# Patient Record
Sex: FEMALE | Race: OTHER | Employment: OTHER | URBAN - METROPOLITAN AREA
[De-identification: names, ages, dates, MRNs, and addresses within clinical notes are randomized per-mention and may not be internally consistent; named-entity substitution may affect disease eponyms.]

---

## 2017-03-07 ENCOUNTER — TELEPHONE (OUTPATIENT)
Dept: FAMILY MEDICINE CLINIC | Facility: CLINIC | Age: 72
End: 2017-03-07

## 2017-03-07 NOTE — TELEPHONE ENCOUNTER
Dr. Leon Felix  Pt has labs pending from 11/2016 would you like to add any other labs. Pt has an OV 3/14/17 and would like to know if she should be fasting for OV.  I will inform pt she can have pending labs drawn prior to OV  Please advise

## 2017-03-07 NOTE — TELEPHONE ENCOUNTER
Pt spouse would like to have a call back to know if pt need to be fasting for her annual [facility] appt   Please advise

## 2017-03-09 ENCOUNTER — OFFICE VISIT (OUTPATIENT)
Dept: FAMILY MEDICINE CLINIC | Facility: CLINIC | Age: 72
End: 2017-03-09

## 2017-03-09 ENCOUNTER — LAB ENCOUNTER (OUTPATIENT)
Dept: LAB | Age: 72
End: 2017-03-09
Attending: FAMILY MEDICINE
Payer: MEDICARE

## 2017-03-09 VITALS
SYSTOLIC BLOOD PRESSURE: 97 MMHG | OXYGEN SATURATION: 96 % | BODY MASS INDEX: 21.43 KG/M2 | HEIGHT: 61.5 IN | HEART RATE: 73 BPM | WEIGHT: 115 LBS | DIASTOLIC BLOOD PRESSURE: 64 MMHG | TEMPERATURE: 98 F

## 2017-03-09 DIAGNOSIS — E78.00 HYPERCHOLESTEREMIA: ICD-10-CM

## 2017-03-09 DIAGNOSIS — E53.8 VITAMIN B12 DEFICIENCY: ICD-10-CM

## 2017-03-09 DIAGNOSIS — E03.9 HYPOTHYROIDISM, UNSPECIFIED TYPE: Primary | ICD-10-CM

## 2017-03-09 DIAGNOSIS — F32.A ANXIETY AND DEPRESSION: ICD-10-CM

## 2017-03-09 DIAGNOSIS — R53.1 WEAKNESS: ICD-10-CM

## 2017-03-09 DIAGNOSIS — E55.9 VITAMIN D DEFICIENCY: ICD-10-CM

## 2017-03-09 DIAGNOSIS — F41.9 ANXIETY AND DEPRESSION: ICD-10-CM

## 2017-03-09 DIAGNOSIS — E03.9 HYPOTHYROIDISM, UNSPECIFIED TYPE: ICD-10-CM

## 2017-03-09 LAB
ALT SERPL-CCNC: 13 U/L (ref 14–54)
ANION GAP SERPL CALC-SCNC: 8 MMOL/L (ref 0–18)
AST SERPL-CCNC: 23 U/L (ref 15–41)
BASOPHILS # BLD: 0.1 K/UL (ref 0–0.2)
BASOPHILS NFR BLD: 1 %
BUN SERPL-MCNC: 11 MG/DL (ref 8–20)
BUN/CREAT SERPL: 12.2 (ref 10–20)
CALCIUM SERPL-MCNC: 9.3 MG/DL (ref 8.5–10.5)
CHLORIDE SERPL-SCNC: 107 MMOL/L (ref 95–110)
CO2 SERPL-SCNC: 24 MMOL/L (ref 22–32)
CREAT SERPL-MCNC: 0.9 MG/DL (ref 0.5–1.5)
EOSINOPHIL # BLD: 0.2 K/UL (ref 0–0.7)
EOSINOPHIL NFR BLD: 3 %
ERYTHROCYTE [DISTWIDTH] IN BLOOD BY AUTOMATED COUNT: 13.1 % (ref 11–15)
GLUCOSE SERPL-MCNC: 127 MG/DL (ref 70–99)
HCT VFR BLD AUTO: 39.7 % (ref 35–48)
HGB BLD-MCNC: 13.3 G/DL (ref 12–16)
LYMPHOCYTES # BLD: 1.9 K/UL (ref 1–4)
LYMPHOCYTES NFR BLD: 28 %
MCH RBC QN AUTO: 32 PG (ref 27–32)
MCHC RBC AUTO-ENTMCNC: 33.5 G/DL (ref 32–37)
MCV RBC AUTO: 95.5 FL (ref 80–100)
MONOCYTES # BLD: 0.5 K/UL (ref 0–1)
MONOCYTES NFR BLD: 7 %
NEUTROPHILS # BLD AUTO: 4.3 K/UL (ref 1.8–7.7)
NEUTROPHILS NFR BLD: 62 %
OSMOLALITY UR CALC.SUM OF ELEC: 289 MOSM/KG (ref 275–295)
PLATELET # BLD AUTO: 272 K/UL (ref 140–400)
PMV BLD AUTO: 8.9 FL (ref 7.4–10.3)
POTASSIUM SERPL-SCNC: 4 MMOL/L (ref 3.3–5.1)
RBC # BLD AUTO: 4.15 M/UL (ref 3.7–5.4)
SODIUM SERPL-SCNC: 139 MMOL/L (ref 136–144)
TSH SERPL-ACNC: 1.15 UIU/ML (ref 0.34–5.6)
VIT B12 SERPL-MCNC: >1500 PG/ML (ref 181–914)
WBC # BLD AUTO: 6.9 K/UL (ref 4–11)

## 2017-03-09 PROCEDURE — 82607 VITAMIN B-12: CPT

## 2017-03-09 PROCEDURE — 80048 BASIC METABOLIC PNL TOTAL CA: CPT

## 2017-03-09 PROCEDURE — 36415 COLL VENOUS BLD VENIPUNCTURE: CPT

## 2017-03-09 PROCEDURE — 99214 OFFICE O/P EST MOD 30 MIN: CPT | Performed by: FAMILY MEDICINE

## 2017-03-09 PROCEDURE — 84460 ALANINE AMINO (ALT) (SGPT): CPT

## 2017-03-09 PROCEDURE — 96372 THER/PROPH/DIAG INJ SC/IM: CPT | Performed by: FAMILY MEDICINE

## 2017-03-09 PROCEDURE — 84443 ASSAY THYROID STIM HORMONE: CPT

## 2017-03-09 PROCEDURE — 85025 COMPLETE CBC W/AUTO DIFF WBC: CPT

## 2017-03-09 PROCEDURE — 84450 TRANSFERASE (AST) (SGOT): CPT

## 2017-03-09 PROCEDURE — G0463 HOSPITAL OUTPT CLINIC VISIT: HCPCS | Performed by: FAMILY MEDICINE

## 2017-03-09 PROCEDURE — 82306 VITAMIN D 25 HYDROXY: CPT

## 2017-03-09 RX ADMIN — CYANOCOBALAMIN 1000 MCG: 1000 INJECTION INTRAMUSCULAR; SUBCUTANEOUS at 15:40:00

## 2017-03-09 NOTE — PROGRESS NOTES
HPI:    Patient ID: Jose Farrar is a 71year old female. HPI     PATIENT HERE FOR FOLLOW UP  FEELS WEAK. MISSED B12 INJECTIONS AS SO WAS OUT OF TOWN    TAKING ALL HER MEDS AS PRESCRIBED.   OVER DUE FOR LABS      Review of Systems   Constitutional: Po Rfl:      Allergies:  Penicillins             Hives, Rash  Sulfa Antibiotics       Rash  Sulfanilamide           Hives   PHYSICAL EXAM:   Physical Exam   Constitutional: She is oriented to person, place, and time.  She appears well-developed and well-nouris

## 2017-03-10 LAB — 25(OH)D3 SERPL-MCNC: 26.3 NG/ML

## 2017-05-23 ENCOUNTER — NURSE ONLY (OUTPATIENT)
Dept: FAMILY MEDICINE CLINIC | Facility: CLINIC | Age: 72
End: 2017-05-23

## 2017-05-23 DIAGNOSIS — E53.8 VITAMIN B12 DEFICIENCY: Primary | ICD-10-CM

## 2017-05-23 PROCEDURE — 96372 THER/PROPH/DIAG INJ SC/IM: CPT | Performed by: FAMILY MEDICINE

## 2017-05-23 RX ORDER — CYANOCOBALAMIN 1000 UG/ML
1000 INJECTION INTRAMUSCULAR; SUBCUTANEOUS ONCE
Status: COMPLETED | OUTPATIENT
Start: 2017-05-23 | End: 2017-05-23

## 2017-05-23 RX ADMIN — CYANOCOBALAMIN 1000 MCG: 1000 INJECTION INTRAMUSCULAR; SUBCUTANEOUS at 15:03:00

## 2017-05-23 NOTE — PROGRESS NOTES
Patient here for her monthly B12 injection. She did miss last months injection due to patient not knowing when her next scheduled dose was. Verbal consent was given by patient to administer injection. No question regarding B12 per patient.  Advisied to sche

## 2017-06-29 ENCOUNTER — NURSE ONLY (OUTPATIENT)
Dept: FAMILY MEDICINE CLINIC | Facility: CLINIC | Age: 72
End: 2017-06-29

## 2017-06-29 DIAGNOSIS — E53.8 VITAMIN B12 DEFICIENCY: Primary | ICD-10-CM

## 2017-06-29 PROCEDURE — 96372 THER/PROPH/DIAG INJ SC/IM: CPT | Performed by: FAMILY MEDICINE

## 2017-06-29 RX ADMIN — CYANOCOBALAMIN 1000 MCG: 1000 INJECTION INTRAMUSCULAR; SUBCUTANEOUS at 13:49:00

## 2017-08-01 ENCOUNTER — OFFICE VISIT (OUTPATIENT)
Dept: FAMILY MEDICINE CLINIC | Facility: CLINIC | Age: 72
End: 2017-08-01

## 2017-08-01 VITALS
BODY MASS INDEX: 20.87 KG/M2 | TEMPERATURE: 98 F | HEIGHT: 61.5 IN | HEART RATE: 69 BPM | DIASTOLIC BLOOD PRESSURE: 58 MMHG | WEIGHT: 112 LBS | SYSTOLIC BLOOD PRESSURE: 90 MMHG

## 2017-08-01 DIAGNOSIS — R35.0 FREQUENCY OF URINATION: Primary | ICD-10-CM

## 2017-08-01 DIAGNOSIS — E53.8 VITAMIN B12 DEFICIENCY: ICD-10-CM

## 2017-08-01 DIAGNOSIS — F32.A ANXIETY AND DEPRESSION: ICD-10-CM

## 2017-08-01 DIAGNOSIS — E78.00 HYPERCHOLESTEREMIA: ICD-10-CM

## 2017-08-01 DIAGNOSIS — E03.9 HYPOTHYROIDISM, UNSPECIFIED TYPE: ICD-10-CM

## 2017-08-01 DIAGNOSIS — F41.9 ANXIETY AND DEPRESSION: ICD-10-CM

## 2017-08-01 LAB
APPEARANCE: CLEAR
BILIRUBIN: NEGATIVE
GLUCOSE (URINE DIPSTICK): NEGATIVE MG/DL
KETONES (URINE DIPSTICK): NEGATIVE MG/DL
NITRITE, URINE: NEGATIVE
PH, URINE: 5 (ref 4.5–8)
PROTEIN (URINE DIPSTICK): NEGATIVE MG/DL
SPECIFIC GRAVITY: 1 (ref 1–1.03)
URINE-COLOR: YELLOW
UROBILINOGEN,SEMI-QN: 0.2 MG/DL (ref 0–1.9)

## 2017-08-01 PROCEDURE — G0463 HOSPITAL OUTPT CLINIC VISIT: HCPCS | Performed by: FAMILY MEDICINE

## 2017-08-01 PROCEDURE — 99214 OFFICE O/P EST MOD 30 MIN: CPT | Performed by: FAMILY MEDICINE

## 2017-08-01 PROCEDURE — 81000 URINALYSIS NONAUTO W/SCOPE: CPT | Performed by: FAMILY MEDICINE

## 2017-08-01 PROCEDURE — 96372 THER/PROPH/DIAG INJ SC/IM: CPT | Performed by: FAMILY MEDICINE

## 2017-08-01 RX ORDER — ERGOCALCIFEROL 1.25 MG/1
50000 CAPSULE ORAL
Qty: 12 CAPSULE | Refills: 3 | Status: SHIPPED | OUTPATIENT
Start: 2017-08-01 | End: 2018-09-10

## 2017-08-01 RX ORDER — LOVASTATIN 20 MG/1
TABLET ORAL
Qty: 90 TABLET | Refills: 3 | Status: SHIPPED | OUTPATIENT
Start: 2017-08-01 | End: 2018-07-07

## 2017-08-01 RX ORDER — AMITRIPTYLINE HYDROCHLORIDE 50 MG/1
TABLET, FILM COATED ORAL
Qty: 90 TABLET | Refills: 3 | Status: SHIPPED | OUTPATIENT
Start: 2017-08-01 | End: 2017-08-01 | Stop reason: CLARIF

## 2017-08-01 RX ORDER — LEVOTHYROXINE SODIUM 0.05 MG/1
50 TABLET ORAL
Qty: 90 TABLET | Refills: 3 | Status: SHIPPED | OUTPATIENT
Start: 2017-08-01 | End: 2018-07-07

## 2017-08-01 RX ORDER — NITROFURANTOIN 25; 75 MG/1; MG/1
100 CAPSULE ORAL 2 TIMES DAILY
Qty: 14 CAPSULE | Refills: 0 | Status: SHIPPED | OUTPATIENT
Start: 2017-08-01 | End: 2017-08-08

## 2017-08-01 RX ADMIN — CYANOCOBALAMIN 1000 MCG: 1000 INJECTION INTRAMUSCULAR; SUBCUTANEOUS at 14:51:00

## 2017-08-01 NOTE — PROGRESS NOTES
HPI:    Patient ID: Amanda Montgomery is a 71year old female. HPI     PAtient here with her son with complains of having a foul odor in the urine. She also complains of having urgency and frequency of urination.   She complains of feeling very tired and w tablet (50 mcg total) by mouth before breakfast. Disp: 90 tablet Rfl: 3   Lovastatin 20 MG Oral Tab TAKE 1 TABLET BY MOUTH EVERY NIGHT Disp: 90 tablet Rfl: 3   Sertraline HCl 50 MG Oral Tab Take 1 tablet (50 mg total) by mouth every morning.  Disp: 90 table Hypothyroidism, unspecified type  tsh stable  Continue current meds    4. Hypercholesteremia    - COMP METABOLIC PANEL (14); Future  - LIPID PANEL; Future    5. Anxiety and depression  Recommend d/c amitriptyline and will increase sertraline gradually.   Fo

## 2017-08-02 ENCOUNTER — TELEPHONE (OUTPATIENT)
Dept: FAMILY MEDICINE CLINIC | Facility: CLINIC | Age: 72
End: 2017-08-02

## 2017-08-02 NOTE — TELEPHONE ENCOUNTER
Son calling requesting clarification on instruction for med, please advise. Current Outpatient Prescriptions:  Sertraline HCl 50 MG Oral Tab Take 1 tablet (50 mg total) by mouth every morning.  Disp: 90 tablet Rfl: 3`   acetaminophen (TYLENOL) 325 MG

## 2017-08-03 NOTE — TELEPHONE ENCOUNTER
Patient's son Arlette Myers informed of Dr Israel Saravia response below regarding instructions, and was able to repeat back instructions correctly.

## 2017-08-03 NOTE — TELEPHONE ENCOUNTER
Pt's son states Dr Jazmin Bates had left him a voice message yesterday and wants to make sure he understood it correctly: patient is to take amitryptoline 50 mg 1/2 tab (25 mg total) for 1 week while starting sertraline 50 mg; after one week just stay on sertraline

## 2017-08-16 ENCOUNTER — APPOINTMENT (OUTPATIENT)
Dept: LAB | Age: 72
End: 2017-08-16
Attending: FAMILY MEDICINE
Payer: MEDICARE

## 2017-08-16 ENCOUNTER — NURSE TRIAGE (OUTPATIENT)
Dept: OTHER | Age: 72
End: 2017-08-16

## 2017-08-16 DIAGNOSIS — R35.0 URINARY FREQUENCY: Primary | ICD-10-CM

## 2017-08-16 DIAGNOSIS — R35.0 URINARY FREQUENCY: ICD-10-CM

## 2017-08-16 LAB
BILIRUB UR QL: NEGATIVE
CLARITY UR: CLEAR
COLOR UR: YELLOW
GLUCOSE UR-MCNC: NEGATIVE MG/DL
HGB UR QL STRIP.AUTO: NEGATIVE
LEUKOCYTE ESTERASE UR QL STRIP.AUTO: NEGATIVE
NITRITE UR QL STRIP.AUTO: NEGATIVE
PH UR: 5 [PH] (ref 5–8)
PROT UR-MCNC: NEGATIVE MG/DL
SP GR UR STRIP: 1.01 (ref 1–1.03)
UROBILINOGEN UR STRIP-ACNC: <2
VIT C UR-MCNC: NEGATIVE MG/DL

## 2017-08-16 PROCEDURE — 81003 URINALYSIS AUTO W/O SCOPE: CPT

## 2017-08-16 PROCEDURE — 87086 URINE CULTURE/COLONY COUNT: CPT

## 2017-08-16 NOTE — TELEPHONE ENCOUNTER
Action Requested: Summary for Provider     []  Critical Lab, Recommendations Needed  [x] Need Additional Advice  []   FYI    []   Need Orders  [] Need Medications Sent to Pharmacy  []  Other     SUMMARY: Patient's son calling stating his mother completed a

## 2017-08-16 NOTE — TELEPHONE ENCOUNTER
Pt's son Saniya Galdamez) answered the phone and instruction provided to have the pt go to the lab today if possible to have her urine tested. U/A and C/S orders entered into chart.     Son instructed to have the pt keep drinking water and go to the IC or ER if

## 2017-08-22 ENCOUNTER — TELEPHONE (OUTPATIENT)
Dept: FAMILY MEDICINE CLINIC | Facility: CLINIC | Age: 72
End: 2017-08-22

## 2017-08-22 NOTE — TELEPHONE ENCOUNTER
Spoke to son (HIPPA verified). Relayed Dr. Mike Costello message as shown below (copied and pasted below). Phone number to urologist provided to pts son. Pt son verbalized understanding of whole message with no further questions at this time.      Notes Recorded by

## 2017-09-06 ENCOUNTER — TELEPHONE (OUTPATIENT)
Dept: FAMILY MEDICINE CLINIC | Facility: CLINIC | Age: 72
End: 2017-09-06

## 2017-09-11 ENCOUNTER — NURSE ONLY (OUTPATIENT)
Dept: FAMILY MEDICINE CLINIC | Facility: CLINIC | Age: 72
End: 2017-09-11

## 2017-09-11 DIAGNOSIS — E53.8 B12 DEFICIENCY: Primary | ICD-10-CM

## 2017-09-11 PROCEDURE — 96372 THER/PROPH/DIAG INJ SC/IM: CPT | Performed by: FAMILY MEDICINE

## 2017-09-11 RX ADMIN — CYANOCOBALAMIN 1000 MCG: 1000 INJECTION INTRAMUSCULAR; SUBCUTANEOUS at 12:49:00

## 2017-09-11 NOTE — H&P
Patient present for Vitamin B12 injection. Patient's name and  verified. Injection well tolerated to left deltoid with no adverse reactions noted.

## 2017-11-10 ENCOUNTER — PATIENT OUTREACH (OUTPATIENT)
Dept: CASE MANAGEMENT | Age: 72
End: 2017-11-10

## 2017-11-10 NOTE — PROGRESS NOTES
Outreached to patient in regards to enrollment to Chronic Care Management program. Left message for patient to return my call at ext. [phone]. Thank you.

## 2017-12-05 ENCOUNTER — OFFICE VISIT (OUTPATIENT)
Dept: FAMILY MEDICINE CLINIC | Facility: CLINIC | Age: 72
End: 2017-12-05

## 2017-12-05 ENCOUNTER — LAB ENCOUNTER (OUTPATIENT)
Dept: LAB | Age: 72
End: 2017-12-05
Attending: FAMILY MEDICINE
Payer: MEDICARE

## 2017-12-05 VITALS
HEART RATE: 69 BPM | BODY MASS INDEX: 21.06 KG/M2 | HEIGHT: 61.5 IN | DIASTOLIC BLOOD PRESSURE: 74 MMHG | TEMPERATURE: 98 F | SYSTOLIC BLOOD PRESSURE: 112 MMHG | WEIGHT: 113 LBS

## 2017-12-05 DIAGNOSIS — E03.9 HYPOTHYROIDISM, UNSPECIFIED TYPE: ICD-10-CM

## 2017-12-05 DIAGNOSIS — E53.8 VITAMIN B12 DEFICIENCY: ICD-10-CM

## 2017-12-05 DIAGNOSIS — E55.9 VITAMIN D DEFICIENCY: ICD-10-CM

## 2017-12-05 DIAGNOSIS — R53.1 WEAKNESS: ICD-10-CM

## 2017-12-05 DIAGNOSIS — R35.0 FREQUENCY OF URINATION: Primary | ICD-10-CM

## 2017-12-05 DIAGNOSIS — F32.A ANXIETY AND DEPRESSION: ICD-10-CM

## 2017-12-05 DIAGNOSIS — L65.9 HAIR LOSS: ICD-10-CM

## 2017-12-05 DIAGNOSIS — F41.9 ANXIETY AND DEPRESSION: ICD-10-CM

## 2017-12-05 DIAGNOSIS — G47.09 OTHER INSOMNIA: ICD-10-CM

## 2017-12-05 PROCEDURE — 82607 VITAMIN B-12: CPT

## 2017-12-05 PROCEDURE — G0463 HOSPITAL OUTPT CLINIC VISIT: HCPCS | Performed by: FAMILY MEDICINE

## 2017-12-05 PROCEDURE — 84466 ASSAY OF TRANSFERRIN: CPT

## 2017-12-05 PROCEDURE — 81002 URINALYSIS NONAUTO W/O SCOPE: CPT | Performed by: FAMILY MEDICINE

## 2017-12-05 PROCEDURE — 85025 COMPLETE CBC W/AUTO DIFF WBC: CPT

## 2017-12-05 PROCEDURE — 84443 ASSAY THYROID STIM HORMONE: CPT

## 2017-12-05 PROCEDURE — 82728 ASSAY OF FERRITIN: CPT

## 2017-12-05 PROCEDURE — 99214 OFFICE O/P EST MOD 30 MIN: CPT | Performed by: FAMILY MEDICINE

## 2017-12-05 PROCEDURE — 83540 ASSAY OF IRON: CPT

## 2017-12-05 PROCEDURE — 96372 THER/PROPH/DIAG INJ SC/IM: CPT | Performed by: FAMILY MEDICINE

## 2017-12-05 PROCEDURE — 80053 COMPREHEN METABOLIC PANEL: CPT

## 2017-12-05 PROCEDURE — 36415 COLL VENOUS BLD VENIPUNCTURE: CPT

## 2017-12-05 RX ORDER — CYANOCOBALAMIN 1000 UG/ML
1000 INJECTION INTRAMUSCULAR; SUBCUTANEOUS
Status: SHIPPED | OUTPATIENT
Start: 2017-12-05 | End: 2018-11-30

## 2017-12-05 RX ORDER — ALPRAZOLAM 0.25 MG/1
0.25 TABLET ORAL NIGHTLY PRN
Qty: 30 TABLET | Refills: 3 | Status: SHIPPED
Start: 2017-12-05 | End: 2018-03-22

## 2017-12-05 RX ADMIN — CYANOCOBALAMIN 1000 MCG: 1000 INJECTION INTRAMUSCULAR; SUBCUTANEOUS at 15:19:00

## 2017-12-05 NOTE — PROGRESS NOTES
HPI:    Patient ID: Edward Schmidt is a 71year old female. HPI     Patient is here for a routine follow-up. She states overall she feels very weak. She feels her whole body hurts.   She always has not a problem with sleeping and states she used a frie morning. Disp: 90 tablet Rfl: 3   ergocalciferol 81238 units Oral Cap Take 1 capsule (50,000 Units total) by mouth every 7 days. Disp: 12 capsule Rfl: 3   Fluticasone Propionate 50 MCG/ACT Nasal Suspension 2 sprays by Each Nare route nightly.  Disp: 3 Bottl FERRITIN; Future  - IRON AND TIBC; Future    5. Anxiety and depression  STABLE  CONTINUE ZOLOFT    6. Other insomnia  TRIAL OF ALPRAZOLAM QHS AS NEEDED  DISCUSSED MEDICATION/ SIDE EFFECTS/ RISK OF DEPENDENCY    7.  Hypothyroidism, unspecified type    - TSH

## 2017-12-13 ENCOUNTER — TELEPHONE (OUTPATIENT)
Dept: OTHER | Age: 72
End: 2017-12-13

## 2017-12-13 RX ORDER — BETAMETHASONE DIPROPIONATE 0.5 MG/G
CREAM TOPICAL
Qty: 1 TUBE | Refills: 0 | Status: CANCELLED | OUTPATIENT
Start: 2017-12-13

## 2017-12-13 NOTE — TELEPHONE ENCOUNTER
Received call from patient's son, who is on patient's HIPAA, who reports the rx for alprazolam was not received by pharmacy.  Son also reports patient was prescribed Zoloft and has taken it, but feels that the amitriptyline is more effective than the Zoloft

## 2017-12-13 NOTE — TELEPHONE ENCOUNTER
AMITRIPTYLINE WAS PRESCRIBED MORE FOR HEADACHES  ZOLOFT IS MORE FOR HER DEPRESSION/ ANXIETY  I COULD INCREASE ZOLOFT TO 100MG DAILY  CAN SEND IN 90 PILLS IF AGREEABLE TO ZOLOFT 100MG DAILY.   FOLLOW UP 3 MONTHS

## 2017-12-15 NOTE — TELEPHONE ENCOUNTER
LMTCB transfer to triage= 3rd attempted call, do you want us to mail a no response certified letter?

## 2017-12-15 NOTE — TELEPHONE ENCOUNTER
Spoke with patient's son and made him aware of Dr. Iliana Mckay orders. Son was also informed that rx for alprazolam was called into pharmacy.  Son voiced understanding and stated patient will hold off on taking the amitriptyline and Zoloft and will see how she d

## 2018-03-22 ENCOUNTER — OFFICE VISIT (OUTPATIENT)
Dept: FAMILY MEDICINE CLINIC | Facility: CLINIC | Age: 73
End: 2018-03-22

## 2018-03-22 VITALS
BODY MASS INDEX: 21.81 KG/M2 | HEART RATE: 76 BPM | HEIGHT: 61.5 IN | WEIGHT: 117 LBS | SYSTOLIC BLOOD PRESSURE: 92 MMHG | DIASTOLIC BLOOD PRESSURE: 59 MMHG

## 2018-03-22 DIAGNOSIS — R06.00 DYSPNEA ON EXERTION: ICD-10-CM

## 2018-03-22 DIAGNOSIS — G89.29 BILATERAL CHRONIC KNEE PAIN: ICD-10-CM

## 2018-03-22 DIAGNOSIS — E53.8 VITAMIN B12 DEFICIENCY: ICD-10-CM

## 2018-03-22 DIAGNOSIS — G47.09 OTHER INSOMNIA: ICD-10-CM

## 2018-03-22 DIAGNOSIS — E55.9 VITAMIN D DEFICIENCY: ICD-10-CM

## 2018-03-22 DIAGNOSIS — Z78.0 POSTMENOPAUSAL: ICD-10-CM

## 2018-03-22 DIAGNOSIS — E78.00 HYPERCHOLESTEREMIA: ICD-10-CM

## 2018-03-22 DIAGNOSIS — F32.A ANXIETY AND DEPRESSION: ICD-10-CM

## 2018-03-22 DIAGNOSIS — M25.562 BILATERAL CHRONIC KNEE PAIN: ICD-10-CM

## 2018-03-22 DIAGNOSIS — R35.0 URINARY FREQUENCY: ICD-10-CM

## 2018-03-22 DIAGNOSIS — M25.561 BILATERAL CHRONIC KNEE PAIN: ICD-10-CM

## 2018-03-22 DIAGNOSIS — Z12.31 VISIT FOR SCREENING MAMMOGRAM: ICD-10-CM

## 2018-03-22 DIAGNOSIS — E03.9 HYPOTHYROIDISM, UNSPECIFIED TYPE: ICD-10-CM

## 2018-03-22 DIAGNOSIS — Z00.00 ENCOUNTER FOR ANNUAL HEALTH EXAMINATION: ICD-10-CM

## 2018-03-22 DIAGNOSIS — F41.9 ANXIETY AND DEPRESSION: ICD-10-CM

## 2018-03-22 DIAGNOSIS — Z00.00 MEDICARE ANNUAL WELLNESS VISIT, SUBSEQUENT: Primary | ICD-10-CM

## 2018-03-22 DIAGNOSIS — Z23 NEED FOR VACCINATION: ICD-10-CM

## 2018-03-22 PROBLEM — R06.09 DYSPNEA ON EXERTION: Status: ACTIVE | Noted: 2018-03-22

## 2018-03-22 PROCEDURE — G0439 PPPS, SUBSEQ VISIT: HCPCS | Performed by: FAMILY MEDICINE

## 2018-03-22 PROCEDURE — 96372 THER/PROPH/DIAG INJ SC/IM: CPT | Performed by: FAMILY MEDICINE

## 2018-03-22 RX ORDER — ALPRAZOLAM 0.25 MG/1
0.25 TABLET ORAL NIGHTLY PRN
Qty: 30 TABLET | Refills: 6 | Status: SHIPPED
Start: 2018-03-22 | End: 2018-07-07

## 2018-03-22 RX ORDER — SERTRALINE HYDROCHLORIDE 100 MG/1
100 TABLET, FILM COATED ORAL DAILY
Qty: 90 TABLET | Refills: 0 | Status: SHIPPED | OUTPATIENT
Start: 2018-03-22 | End: 2018-07-07

## 2018-03-22 RX ORDER — CYANOCOBALAMIN 1000 UG/ML
1000 INJECTION INTRAMUSCULAR; SUBCUTANEOUS ONCE
Status: COMPLETED | OUTPATIENT
Start: 2018-03-22 | End: 2018-03-22

## 2018-03-22 RX ADMIN — CYANOCOBALAMIN 1000 MCG: 1000 INJECTION INTRAMUSCULAR; SUBCUTANEOUS at 11:31:00

## 2018-03-22 NOTE — PROGRESS NOTES
HPI:   Omar Stone is a 68year old female who presents for a Medicare Subsequent Annual Wellness visit (Pt already had Initial Annual Wellness).       Her last annual assessment has been over 1 year: Annual Physical due on 02/02/1947         Fall/Risk Little interest or pleasure in doing things (over the last two weeks)?: Several days  Feeling down, depressed, or hopeless (over the last two weeks)?: More than half the days  PHQ-2 SCORE: 3     PHQ-9 Depression Screen     1.    2.    3.    4.    5.    6 CREATSERUM 0.80 12/05/2017    (H) 12/05/2017        CBC  (most recent labs)     Lab Results  Component Value Date   WBC 8.2 12/05/2017   HGB 13.3 12/05/2017    12/05/2017        ALLERGIES:   She is allergic to penicillins; sulfa antibiotics normal  Ears, nose, mouth, throat, and face: negative  Respiratory: negative  Cardiovascular: negative  Gastrointestinal: negative  Genitourinary:urinary frquency  Integument/breast: negative  Hematologic/lymphatic: negative  Musculoskeletal:negative  Neur Physical Exam     Vaccination History     Immunization History   Administered Date(s) Administered   • HIGH DOSE FLU 65 YRS AND OLDER PRSV FREE SINGLE D (44474) FLU CLINIC 11/21/2016   • Influenza 11/21/2011, 01/22/2013   Pended Date( (VITAMIN B12) 1000 MCG/ML injection 1,000 mcg; Inject 1 mL (1,000 mcg total) into the muscle once. 9. Other insomnia      10. Dyspnea on exertion  ekg, labs    11. Encounter for annual health examination      12.  Visit for screening mammogram    - [facility] Sigmoidoscopy Screen every 10 years No results found for this or any previous visit. No flowsheet data found. Fecal Occult Blood Annually No results found for: FOB No flowsheet data found.     Glaucoma Screening      Ophthalmology Visit Annually: Muaricio Bautista with your pharmacy  prescription benefits                    Template: [facility]ER[facility] MEDICARE ANNUAL ASSESSMENT FEMALE Anival Klein [[id number]]

## 2018-03-22 NOTE — PATIENT INSTRUCTIONS
Anum Deluca's SCREENING SCHEDULE   Tests on this list are recommended by your physician but may not be covered, or covered at this frequency, by your insurer. Please check with your insurance carrier before scheduling to verify coverage.    PREVENTATIV to Age 76     Colonoscopy Screen   Covered every 10 years- more often if abnormal Colonoscopy,10 Years due on 03/02/2026 Update Health Maintenance if applicable    Flex Sigmoidoscopy Screen  Covered every 5 years No results found for this or any previous v 03/22/18  -PNEUMOCOCCAL VACC, 13 SKYLER IM    Please get once after your 65th birthday    Pneumococcal 23 (Pneumovax)  Covered Once after 65 No orders found for this or any previous visit.  Please get once after your 65th birthday    Hepatitis B for Moderate/H

## 2018-03-23 ENCOUNTER — TELEPHONE (OUTPATIENT)
Dept: OTHER | Age: 73
End: 2018-03-23

## 2018-03-23 NOTE — TELEPHONE ENCOUNTER
Pt was seen by Dr. Bessy Campbell yesterday for physical.   Pt has been prescribed Zoloft for some time but it has not been working and it was unclear if pt has been taking the medication. Son confirmed today that the pt has been taking the medication regularly.  H

## 2018-03-24 NOTE — TELEPHONE ENCOUNTER
Called and spoke with patient's son. He mentions that she has been somewhat depressed but also sometimes will be in an okay mood and then suddenly will snap. He is wondering if there is some element with bipolar illness.   Recommended that she should see

## 2018-04-06 ENCOUNTER — TELEPHONE (OUTPATIENT)
Dept: OTHER | Age: 73
End: 2018-04-06

## 2018-04-06 NOTE — TELEPHONE ENCOUNTER
Pt will be completing labs this weekend.   Requesting UA be added as wakes up in middle of night to urinate frequently

## 2018-05-01 ENCOUNTER — NURSE TRIAGE (OUTPATIENT)
Dept: OTHER | Age: 73
End: 2018-05-01

## 2018-05-01 ENCOUNTER — OFFICE VISIT (OUTPATIENT)
Dept: FAMILY MEDICINE CLINIC | Facility: CLINIC | Age: 73
End: 2018-05-01

## 2018-05-01 VITALS
WEIGHT: 116 LBS | HEART RATE: 80 BPM | SYSTOLIC BLOOD PRESSURE: 122 MMHG | DIASTOLIC BLOOD PRESSURE: 80 MMHG | BODY MASS INDEX: 21.62 KG/M2 | HEIGHT: 61.5 IN | TEMPERATURE: 98 F

## 2018-05-01 DIAGNOSIS — R05.9 COUGH: ICD-10-CM

## 2018-05-01 DIAGNOSIS — R35.0 URINARY FREQUENCY: ICD-10-CM

## 2018-05-01 DIAGNOSIS — E53.8 VITAMIN B12 DEFICIENCY: ICD-10-CM

## 2018-05-01 DIAGNOSIS — R53.83 FATIGUE, UNSPECIFIED TYPE: Primary | ICD-10-CM

## 2018-05-01 DIAGNOSIS — J02.9 SORE THROAT: ICD-10-CM

## 2018-05-01 DIAGNOSIS — E03.9 HYPOTHYROIDISM, UNSPECIFIED TYPE: ICD-10-CM

## 2018-05-01 PROCEDURE — G0463 HOSPITAL OUTPT CLINIC VISIT: HCPCS | Performed by: FAMILY MEDICINE

## 2018-05-01 PROCEDURE — 99214 OFFICE O/P EST MOD 30 MIN: CPT | Performed by: FAMILY MEDICINE

## 2018-05-01 PROCEDURE — 96372 THER/PROPH/DIAG INJ SC/IM: CPT | Performed by: FAMILY MEDICINE

## 2018-05-01 RX ORDER — BENZONATATE 100 MG/1
100 CAPSULE ORAL 3 TIMES DAILY PRN
Qty: 30 CAPSULE | Refills: 0 | Status: SHIPPED | OUTPATIENT
Start: 2018-05-01 | End: 2018-09-10 | Stop reason: ALTCHOICE

## 2018-05-01 RX ADMIN — CYANOCOBALAMIN 1000 MCG: 1000 INJECTION INTRAMUSCULAR; SUBCUTANEOUS at 15:08:00

## 2018-05-01 NOTE — PROGRESS NOTES
HPI:    Patient ID: Celia Oliveira is a 68year old female.     HPI     Patient presents with:  Cough  Stuffy Nose  Sore Throat  Dizziness  Other: pt also c/o frequent urination    Patient was added to the schedule today with complains of having coughing an Application topically 2 (two) times daily as needed.  Disp: 1 Tube Rfl: 1   Levothyroxine Sodium 50 MCG Oral Tab Take 1 tablet (50 mcg total) by mouth before breakfast. Disp: 90 tablet Rfl: 3   Lovastatin 20 MG Oral Tab TAKE 1 TABLET BY MOUTH EVERY NIGHT Sadia Marcano recommend a chest x-ray. 6. Vitamin B12 deficiency  B12 injection today.   Reminded patient she is due to get these monthly and there is standing order.  - VITAMIN B12; Future      Orders Placed This Encounter      Vitamin B12 [E]    Meds This Visit:  Si

## 2018-05-01 NOTE — TELEPHONE ENCOUNTER
Action Requested: Summary for Provider     []  Critical Lab, Recommendations Needed  [x] Need Additional Advice  []   FYI    []   Need Orders  [] Need Medications Sent to Pharmacy  []  Other     SUMMARY: Patient requesting appt today or tomorrow with AMGUERO O

## 2018-06-29 ENCOUNTER — LAB ENCOUNTER (OUTPATIENT)
Dept: LAB | Age: 73
End: 2018-06-29
Attending: FAMILY MEDICINE
Payer: MEDICARE

## 2018-06-29 ENCOUNTER — APPOINTMENT (OUTPATIENT)
Dept: LAB | Age: 73
End: 2018-06-29
Attending: FAMILY MEDICINE
Payer: MEDICARE

## 2018-06-29 DIAGNOSIS — E53.8 VITAMIN B12 DEFICIENCY: ICD-10-CM

## 2018-06-29 DIAGNOSIS — R35.0 URINARY FREQUENCY: ICD-10-CM

## 2018-06-29 DIAGNOSIS — E78.00 HYPERCHOLESTEREMIA: ICD-10-CM

## 2018-06-29 DIAGNOSIS — R06.00 DYSPNEA ON EXERTION: ICD-10-CM

## 2018-06-29 DIAGNOSIS — E03.9 HYPOTHYROIDISM, UNSPECIFIED TYPE: ICD-10-CM

## 2018-06-29 LAB
ALBUMIN SERPL BCP-MCNC: 4 G/DL (ref 3.5–4.8)
ALBUMIN/GLOB SERPL: 1.3 {RATIO} (ref 1–2)
ALP SERPL-CCNC: 84 U/L (ref 32–100)
ALT SERPL-CCNC: 11 U/L (ref 14–54)
ANION GAP SERPL CALC-SCNC: 8 MMOL/L (ref 0–18)
AST SERPL-CCNC: 22 U/L (ref 15–41)
BACTERIA UR QL AUTO: NEGATIVE /HPF
BASOPHILS # BLD: 0.1 K/UL (ref 0–0.2)
BASOPHILS NFR BLD: 1 %
BILIRUB SERPL-MCNC: 0.5 MG/DL (ref 0.3–1.2)
BILIRUB UR QL: NEGATIVE
BUN SERPL-MCNC: 15 MG/DL (ref 8–20)
BUN/CREAT SERPL: 15.8 (ref 10–20)
CALCIUM SERPL-MCNC: 9.5 MG/DL (ref 8.5–10.5)
CHLORIDE SERPL-SCNC: 110 MMOL/L (ref 95–110)
CHOLEST SERPL-MCNC: 273 MG/DL (ref 110–200)
CLARITY UR: CLEAR
CO2 SERPL-SCNC: 22 MMOL/L (ref 22–32)
COLOR UR: YELLOW
CREAT SERPL-MCNC: 0.95 MG/DL (ref 0.5–1.5)
EOSINOPHIL # BLD: 0.1 K/UL (ref 0–0.7)
EOSINOPHIL NFR BLD: 3 %
ERYTHROCYTE [DISTWIDTH] IN BLOOD BY AUTOMATED COUNT: 13.4 % (ref 11–15)
GLOBULIN PLAS-MCNC: 3.1 G/DL (ref 2.5–3.7)
GLUCOSE SERPL-MCNC: 107 MG/DL (ref 70–99)
GLUCOSE UR-MCNC: NEGATIVE MG/DL
HCT VFR BLD AUTO: 40.9 % (ref 35–48)
HDLC SERPL-MCNC: 40 MG/DL
HGB BLD-MCNC: 13.5 G/DL (ref 12–16)
KETONES UR-MCNC: NEGATIVE MG/DL
LDLC SERPL CALC-MCNC: 188 MG/DL (ref 0–99)
LEUKOCYTE ESTERASE UR QL STRIP.AUTO: NEGATIVE
LYMPHOCYTES # BLD: 1.8 K/UL (ref 1–4)
LYMPHOCYTES NFR BLD: 32 %
MCH RBC QN AUTO: 31.8 PG (ref 27–32)
MCHC RBC AUTO-ENTMCNC: 33 G/DL (ref 32–37)
MCV RBC AUTO: 96.4 FL (ref 80–100)
MONOCYTES # BLD: 0.4 K/UL (ref 0–1)
MONOCYTES NFR BLD: 7 %
NEUTROPHILS # BLD AUTO: 3.2 K/UL (ref 1.8–7.7)
NEUTROPHILS NFR BLD: 57 %
NITRITE UR QL STRIP.AUTO: NEGATIVE
NONHDLC SERPL-MCNC: 233 MG/DL
OSMOLALITY UR CALC.SUM OF ELEC: 291 MOSM/KG (ref 275–295)
PATIENT FASTING: YES
PH UR: 5 [PH] (ref 5–8)
PLATELET # BLD AUTO: 305 K/UL (ref 140–400)
PMV BLD AUTO: 9 FL (ref 7.4–10.3)
POTASSIUM SERPL-SCNC: 5.1 MMOL/L (ref 3.3–5.1)
PROT SERPL-MCNC: 7.1 G/DL (ref 5.9–8.4)
PROT UR-MCNC: NEGATIVE MG/DL
RBC # BLD AUTO: 4.25 M/UL (ref 3.7–5.4)
RBC #/AREA URNS AUTO: 1 /HPF
SODIUM SERPL-SCNC: 140 MMOL/L (ref 136–144)
SP GR UR STRIP: 1.02 (ref 1–1.03)
TRIGL SERPL-MCNC: 227 MG/DL (ref 1–149)
TSH SERPL-ACNC: 2.77 UIU/ML (ref 0.45–5.33)
UROBILINOGEN UR STRIP-ACNC: 2
VIT B12 SERPL-MCNC: 257 PG/ML (ref 181–914)
VIT C UR-MCNC: NEGATIVE MG/DL
WBC # BLD AUTO: 5.6 K/UL (ref 4–11)
WBC #/AREA URNS AUTO: 2 /HPF

## 2018-06-29 PROCEDURE — 80061 LIPID PANEL: CPT

## 2018-06-29 PROCEDURE — 36415 COLL VENOUS BLD VENIPUNCTURE: CPT

## 2018-06-29 PROCEDURE — 87086 URINE CULTURE/COLONY COUNT: CPT

## 2018-06-29 PROCEDURE — 93005 ELECTROCARDIOGRAM TRACING: CPT

## 2018-06-29 PROCEDURE — 81001 URINALYSIS AUTO W/SCOPE: CPT

## 2018-06-29 PROCEDURE — 85025 COMPLETE CBC W/AUTO DIFF WBC: CPT

## 2018-06-29 PROCEDURE — 82607 VITAMIN B-12: CPT

## 2018-06-29 PROCEDURE — 84443 ASSAY THYROID STIM HORMONE: CPT

## 2018-06-29 PROCEDURE — 80053 COMPREHEN METABOLIC PANEL: CPT

## 2018-06-29 PROCEDURE — 93010 ELECTROCARDIOGRAM REPORT: CPT | Performed by: FAMILY MEDICINE

## 2018-07-03 ENCOUNTER — TELEPHONE (OUTPATIENT)
Dept: OTHER | Age: 73
End: 2018-07-03

## 2018-07-03 DIAGNOSIS — E78.00 HYPERCHOLESTEREMIA: ICD-10-CM

## 2018-07-03 DIAGNOSIS — E53.8 VITAMIN B12 DEFICIENCY: Primary | ICD-10-CM

## 2018-07-03 DIAGNOSIS — E78.00 ELEVATED CHOLESTEROL: ICD-10-CM

## 2018-07-03 NOTE — TELEPHONE ENCOUNTER
----- Message from Celia Chavez MD sent at 7/1/2018  8:04 PM CDT -----  Urine culture shows no growth. Thyroid test is normal.  Cholesterol is very elevated and unsure if patient has been taking her cholesterol medication daily.  She is supposed to be on l

## 2018-07-03 NOTE — TELEPHONE ENCOUNTER
I have placed for B12 injections in the past.  The order for b12 injection should be in the system.   Please le me know if it isnt

## 2018-07-04 NOTE — TELEPHONE ENCOUNTER
cyanocobalamin (VITAMIN B12) 1000 MCG/ML injection 1,000 mcg   3/22/2018 3/22/2018    Sig - Route: Inject 1 mL (1,000 mcg total) into the muscle once.  - Intramuscular    Non-formulary Exception Code:  Alternate Therapy Ineffective      Last ordered 3/22/18

## 2018-07-05 RX ORDER — CYANOCOBALAMIN 1000 UG/ML
1000 INJECTION INTRAMUSCULAR; SUBCUTANEOUS
Qty: 1 VIAL | Refills: 0 | Status: CANCELLED | COMMUNITY
Start: 2018-07-05

## 2018-07-07 RX ORDER — LEVOTHYROXINE SODIUM 0.05 MG/1
50 TABLET ORAL
Qty: 90 TABLET | Refills: 0 | Status: SHIPPED | OUTPATIENT
Start: 2018-07-07 | End: 2018-09-10

## 2018-07-07 NOTE — TELEPHONE ENCOUNTER
Please note/advise. Thank you. Please reply to pool: EM RN TRIAGE    Pt's son Gelacio Dire- on HIPAA) answered phone (Name and  of pt verified).  He states that he aware that the pt needs to come to the office for the vitamin b12 injections but is asking for Wilver Coto MD    Office Visit    7 months ago Frequency of urination    Verner Jester, Cindi Jourdain, MD    Office Visit    9 months ago B12 deficiency    [facility]

## 2018-07-07 NOTE — TELEPHONE ENCOUNTER
While speaking with the pt's son Bhavik Dawn) about another matter, he requested further refills for the pt's medications. Medications verified that are needed. Son states pt is almost out of medications.     Rx's pended for approval. Alprazolam pended for phon

## 2018-07-08 RX ORDER — ALPRAZOLAM 0.25 MG/1
0.25 TABLET ORAL NIGHTLY PRN
Qty: 30 TABLET | Refills: 6 | OUTPATIENT
Start: 2018-07-08 | End: 2018-12-07

## 2018-07-08 RX ORDER — SERTRALINE HYDROCHLORIDE 100 MG/1
100 TABLET, FILM COATED ORAL DAILY
Qty: 90 TABLET | Refills: 0 | Status: SHIPPED | OUTPATIENT
Start: 2018-07-08 | End: 2018-09-10

## 2018-07-08 RX ORDER — BETAMETHASONE DIPROPIONATE 0.5 MG/G
1 CREAM TOPICAL 2 TIMES DAILY PRN
Qty: 1 TUBE | Refills: 1 | Status: SHIPPED | OUTPATIENT
Start: 2018-07-08 | End: 2018-09-29

## 2018-07-08 RX ORDER — LOVASTATIN 20 MG/1
TABLET ORAL
Qty: 90 TABLET | Refills: 3 | Status: SHIPPED | OUTPATIENT
Start: 2018-07-08 | End: 2019-06-14

## 2018-07-09 NOTE — TELEPHONE ENCOUNTER
Notified patient's son Cesar José, HIPAA verified, relayed Dr Emily De La Torre message , alprazolam called into [facility] [address]

## 2018-07-11 PROBLEM — Z96.1 PSEUDOPHAKIA OF BOTH EYES: Status: ACTIVE | Noted: 2018-07-11

## 2018-07-11 PROBLEM — H43.813 PVD (POSTERIOR VITREOUS DETACHMENT), BOTH EYES: Status: ACTIVE | Noted: 2018-07-11

## 2018-07-11 PROBLEM — H04.123 DRY EYE SYNDROME OF LACRIMAL GLAND, BILATERAL: Status: ACTIVE | Noted: 2018-07-11

## 2018-09-10 ENCOUNTER — APPOINTMENT (OUTPATIENT)
Dept: LAB | Age: 73
End: 2018-09-10
Attending: FAMILY MEDICINE
Payer: MEDICARE

## 2018-09-10 ENCOUNTER — OFFICE VISIT (OUTPATIENT)
Dept: FAMILY MEDICINE CLINIC | Facility: CLINIC | Age: 73
End: 2018-09-10
Payer: MEDICARE

## 2018-09-10 VITALS
BODY MASS INDEX: 20.87 KG/M2 | SYSTOLIC BLOOD PRESSURE: 125 MMHG | DIASTOLIC BLOOD PRESSURE: 64 MMHG | WEIGHT: 112 LBS | TEMPERATURE: 98 F | HEIGHT: 61.5 IN | HEART RATE: 66 BPM

## 2018-09-10 DIAGNOSIS — E78.00 HYPERCHOLESTEREMIA: ICD-10-CM

## 2018-09-10 DIAGNOSIS — N32.81 OVERACTIVE BLADDER: ICD-10-CM

## 2018-09-10 DIAGNOSIS — R07.9 LEFT SIDED CHEST PAIN: ICD-10-CM

## 2018-09-10 DIAGNOSIS — R35.0 URINARY FREQUENCY: Primary | ICD-10-CM

## 2018-09-10 DIAGNOSIS — F32.A ANXIETY AND DEPRESSION: ICD-10-CM

## 2018-09-10 DIAGNOSIS — E53.8 VITAMIN B12 DEFICIENCY: ICD-10-CM

## 2018-09-10 DIAGNOSIS — F41.9 ANXIETY AND DEPRESSION: ICD-10-CM

## 2018-09-10 PROCEDURE — 99214 OFFICE O/P EST MOD 30 MIN: CPT | Performed by: FAMILY MEDICINE

## 2018-09-10 PROCEDURE — G0463 HOSPITAL OUTPT CLINIC VISIT: HCPCS | Performed by: FAMILY MEDICINE

## 2018-09-10 PROCEDURE — 96372 THER/PROPH/DIAG INJ SC/IM: CPT | Performed by: FAMILY MEDICINE

## 2018-09-10 PROCEDURE — 93010 ELECTROCARDIOGRAM REPORT: CPT | Performed by: FAMILY MEDICINE

## 2018-09-10 PROCEDURE — 93005 ELECTROCARDIOGRAM TRACING: CPT

## 2018-09-10 RX ORDER — LEVOTHYROXINE SODIUM 0.05 MG/1
50 TABLET ORAL
Qty: 90 TABLET | Refills: 3 | Status: SHIPPED | OUTPATIENT
Start: 2018-09-10 | End: 2019-07-10

## 2018-09-10 RX ORDER — SERTRALINE HYDROCHLORIDE 100 MG/1
100 TABLET, FILM COATED ORAL DAILY
Qty: 90 TABLET | Refills: 3 | Status: SHIPPED | OUTPATIENT
Start: 2018-09-10 | End: 2019-10-16

## 2018-09-10 RX ORDER — ERGOCALCIFEROL 1.25 MG/1
50000 CAPSULE ORAL
Qty: 12 CAPSULE | Refills: 3 | Status: SHIPPED | OUTPATIENT
Start: 2018-09-10 | End: 2020-12-10

## 2018-09-10 RX ORDER — OXYBUTYNIN CHLORIDE 5 MG/1
2.5 TABLET ORAL 2 TIMES DAILY
Qty: 30 TABLET | Refills: 0 | Status: SHIPPED | OUTPATIENT
Start: 2018-09-10 | End: 2018-11-01

## 2018-09-10 RX ADMIN — CYANOCOBALAMIN 1000 MCG: 1000 INJECTION INTRAMUSCULAR; SUBCUTANEOUS at 16:26:00

## 2018-09-10 NOTE — PROGRESS NOTES
HPI:    Patient ID: Candida Montgomery is a 68year old female.     HPI   Patient presents with:  Tired  Headache  Leg Pain: bilateral mostly during the night   Chest Pain: left side pt states she feels her heart working to fast   Imm/Inj: B12  [place] acetaminophen (TYLENOL) 325 MG Oral Tab Take 2 tablets by mouth every 8 (eight) hours as needed. Disp:  Rfl:    Multiple Vitamin (MULTI-VITAMINS) Oral Tab Take 1 tablet by mouth once daily.  Take with food Disp:  Rfl:    benzonatate 100 MG Oral Cap Take 1

## 2018-09-11 ENCOUNTER — TELEPHONE (OUTPATIENT)
Dept: OTHER | Age: 73
End: 2018-09-11

## 2018-09-11 NOTE — TELEPHONE ENCOUNTER
----- Message from Rena Zepeda MD sent at 9/10/2018  3:54 PM CDT -----  ekg shows no acute changes  Recommend cardiac stress echo. pls inform son, Prachi Chad.

## 2018-09-13 NOTE — TELEPHONE ENCOUNTER
Patient's son Celia Aguilera ([place] on file) informed of results (identified name and ) and recommendations, as per provider's result note. Son voiced understanding and agrees with recommendation; info to call to schedule echo provided.

## 2018-09-29 RX ORDER — BENZONATATE 100 MG/1
CAPSULE ORAL
Qty: 30 CAPSULE | Refills: 0 | OUTPATIENT
Start: 2018-09-29

## 2018-09-29 RX ORDER — BETAMETHASONE DIPROPIONATE 0.5 MG/G
1 CREAM TOPICAL 2 TIMES DAILY PRN
Qty: 1 TUBE | Refills: 1 | Status: SHIPPED | OUTPATIENT
Start: 2018-09-29 | End: 2019-10-16

## 2018-09-29 RX ORDER — SERTRALINE HYDROCHLORIDE 100 MG/1
TABLET, FILM COATED ORAL
Qty: 90 TABLET | Refills: 0 | OUTPATIENT
Start: 2018-09-29

## 2018-09-29 RX ORDER — ALPRAZOLAM 0.25 MG/1
TABLET ORAL
Qty: 30 TABLET | Refills: 0 | OUTPATIENT
Start: 2018-09-29

## 2018-09-29 NOTE — TELEPHONE ENCOUNTER
Dr Linda Hancock, please advise.     Refill Protocol Appointment Criteria  · Appointment scheduled in the past 6 months or in the next 3 months  Recent Outpatient Visits            2 weeks ago Urinary frequency    [facility], Ayde Ulloa, John,

## 2018-10-24 ENCOUNTER — HOSPITAL ENCOUNTER (OUTPATIENT)
Dept: CV DIAGNOSTICS | Facility: HOSPITAL | Age: 73
Discharge: HOME | End: 2018-10-24
Attending: FAMILY MEDICINE
Payer: MEDICARE

## 2018-10-24 ENCOUNTER — HOSPITAL ENCOUNTER (OUTPATIENT)
Dept: NUCLEAR MEDICINE | Facility: HOSPITAL | Age: 73
Discharge: HOME | End: 2018-10-24
Attending: FAMILY MEDICINE
Payer: MEDICARE

## 2018-10-24 DIAGNOSIS — R07.9 CHEST PAIN, UNSPECIFIED: ICD-10-CM

## 2018-10-24 DIAGNOSIS — E78.00 HYPERCHOLESTEREMIA: ICD-10-CM

## 2018-10-24 DIAGNOSIS — R07.9 LEFT SIDED CHEST PAIN: ICD-10-CM

## 2018-10-24 PROCEDURE — 93017 CV STRESS TEST TRACING ONLY: CPT | Performed by: FAMILY MEDICINE

## 2018-10-24 PROCEDURE — 93016 CV STRESS TEST SUPVJ ONLY: CPT | Performed by: FAMILY MEDICINE

## 2018-10-24 PROCEDURE — 93018 CV STRESS TEST I&R ONLY: CPT | Performed by: FAMILY MEDICINE

## 2018-10-24 PROCEDURE — A4216 STERILE WATER/SALINE, 10 ML: HCPCS

## 2018-10-24 PROCEDURE — 78452 HT MUSCLE IMAGE SPECT MULT: CPT | Performed by: FAMILY MEDICINE

## 2018-10-24 RX ORDER — 0.9 % SODIUM CHLORIDE 0.9 %
VIAL (ML) INJECTION
Status: COMPLETED
Start: 2018-10-24 | End: 2018-10-24

## 2018-10-24 RX ADMIN — 0.9 % SODIUM CHLORIDE: 0.9 % VIAL (ML) INJECTION at 12:45:00

## 2018-11-01 ENCOUNTER — OFFICE VISIT (OUTPATIENT)
Dept: FAMILY MEDICINE CLINIC | Facility: CLINIC | Age: 73
End: 2018-11-01
Payer: MEDICARE

## 2018-11-01 VITALS
BODY MASS INDEX: 20.69 KG/M2 | HEIGHT: 61.5 IN | WEIGHT: 111 LBS | TEMPERATURE: 98 F | HEART RATE: 63 BPM | SYSTOLIC BLOOD PRESSURE: 110 MMHG | DIASTOLIC BLOOD PRESSURE: 71 MMHG

## 2018-11-01 DIAGNOSIS — E78.00 HYPERCHOLESTEREMIA: ICD-10-CM

## 2018-11-01 DIAGNOSIS — F32.A ANXIETY AND DEPRESSION: ICD-10-CM

## 2018-11-01 DIAGNOSIS — R35.0 URINARY FREQUENCY: ICD-10-CM

## 2018-11-01 DIAGNOSIS — F41.9 ANXIETY AND DEPRESSION: ICD-10-CM

## 2018-11-01 DIAGNOSIS — Z23 NEED FOR INFLUENZA VACCINATION: ICD-10-CM

## 2018-11-01 DIAGNOSIS — E53.8 VITAMIN B12 DEFICIENCY: ICD-10-CM

## 2018-11-01 DIAGNOSIS — E03.9 HYPOTHYROIDISM, UNSPECIFIED TYPE: Primary | ICD-10-CM

## 2018-11-01 PROCEDURE — G0008 ADMIN INFLUENZA VIRUS VAC: HCPCS | Performed by: FAMILY MEDICINE

## 2018-11-01 PROCEDURE — 96372 THER/PROPH/DIAG INJ SC/IM: CPT | Performed by: FAMILY MEDICINE

## 2018-11-01 PROCEDURE — 99214 OFFICE O/P EST MOD 30 MIN: CPT | Performed by: FAMILY MEDICINE

## 2018-11-01 PROCEDURE — 90653 IIV ADJUVANT VACCINE IM: CPT | Performed by: FAMILY MEDICINE

## 2018-11-01 PROCEDURE — G0463 HOSPITAL OUTPT CLINIC VISIT: HCPCS | Performed by: FAMILY MEDICINE

## 2018-11-01 RX ORDER — OXYBUTYNIN CHLORIDE 5 MG/1
5 TABLET ORAL NIGHTLY PRN
Qty: 90 TABLET | Refills: 3 | Status: SHIPPED | OUTPATIENT
Start: 2018-11-01 | End: 2019-02-06

## 2018-11-01 RX ORDER — CYANOCOBALAMIN 1000 UG/ML
1000 INJECTION INTRAMUSCULAR; SUBCUTANEOUS ONCE
Status: COMPLETED | OUTPATIENT
Start: 2018-11-01 | End: 2018-11-01

## 2018-11-01 RX ADMIN — CYANOCOBALAMIN 1000 MCG: 1000 INJECTION INTRAMUSCULAR; SUBCUTANEOUS at 14:48:00

## 2018-11-01 NOTE — PROGRESS NOTES
HPI:    Patient ID: David Dickson is a 68year old female. HPI     Patient presents with:  Test Results: card nuc  test   Chest Pain  Back Pain (musculoskeletal): left side     Patient here for fup on test results.   She had a recent cardiac nuclear s daily. Disp: 90 tablet Rfl: 3   Levothyroxine Sodium 50 MCG Oral Tab Take 1 tablet (50 mcg total) by mouth before breakfast. Disp: 90 tablet Rfl: 3   ergocalciferol 08874 units Oral Cap Take 1 capsule (50,000 Units total) by mouth every 7 days.  Disp: 12 ca regularly. Advised taking medication every day and being compliant with medications. Suggested seeing a psychiatrist but patient was not open to this idea. She is leaving to go see her daughter in [place] soon and will return and decide about this.

## 2018-12-04 ENCOUNTER — NURSE ONLY (OUTPATIENT)
Dept: FAMILY MEDICINE CLINIC | Facility: CLINIC | Age: 73
End: 2018-12-04
Payer: MEDICARE

## 2018-12-04 DIAGNOSIS — E53.8 VITAMIN B12 DEFICIENCY: Primary | ICD-10-CM

## 2018-12-04 PROCEDURE — 96372 THER/PROPH/DIAG INJ SC/IM: CPT | Performed by: FAMILY MEDICINE

## 2018-12-04 RX ADMIN — CYANOCOBALAMIN 1000 MCG: 1000 INJECTION INTRAMUSCULAR; SUBCUTANEOUS at 14:09:00

## 2018-12-07 DIAGNOSIS — E78.00 HYPERCHOLESTEREMIA: ICD-10-CM

## 2018-12-08 RX ORDER — LOVASTATIN 20 MG/1
TABLET ORAL
Qty: 90 TABLET | Refills: 0 | OUTPATIENT
Start: 2018-12-08

## 2018-12-08 RX ORDER — SERTRALINE HYDROCHLORIDE 100 MG/1
TABLET, FILM COATED ORAL
Qty: 90 TABLET | Refills: 0 | OUTPATIENT
Start: 2018-12-08

## 2018-12-08 NOTE — TELEPHONE ENCOUNTER
Please advise Alprazolam refill, last filled 7/8/18    Refill Protocol Appointment Criteria  · Appointment scheduled in the past 6 months or in the next 3 months  Recent Outpatient Visits            4 days ago Vitamin B12 deficiency    [facility]

## 2018-12-09 RX ORDER — ALPRAZOLAM 0.25 MG/1
TABLET ORAL
Qty: 30 TABLET | Refills: 0 | OUTPATIENT
Start: 2018-12-09 | End: 2019-06-13

## 2019-02-06 ENCOUNTER — OFFICE VISIT (OUTPATIENT)
Dept: FAMILY MEDICINE CLINIC | Facility: CLINIC | Age: 74
End: 2019-02-06
Payer: MEDICARE

## 2019-02-06 VITALS
HEART RATE: 94 BPM | TEMPERATURE: 98 F | HEIGHT: 61.5 IN | BODY MASS INDEX: 21.25 KG/M2 | DIASTOLIC BLOOD PRESSURE: 70 MMHG | SYSTOLIC BLOOD PRESSURE: 114 MMHG | WEIGHT: 114 LBS

## 2019-02-06 DIAGNOSIS — R07.9 LEFT SIDED CHEST PAIN: ICD-10-CM

## 2019-02-06 DIAGNOSIS — E53.8 VITAMIN B12 DEFICIENCY: ICD-10-CM

## 2019-02-06 DIAGNOSIS — R35.0 URINARY FREQUENCY: ICD-10-CM

## 2019-02-06 DIAGNOSIS — R35.1 NOCTURIA: ICD-10-CM

## 2019-02-06 DIAGNOSIS — R42 DIZZINESS: Primary | ICD-10-CM

## 2019-02-06 DIAGNOSIS — E78.00 HYPERCHOLESTEREMIA: ICD-10-CM

## 2019-02-06 DIAGNOSIS — E03.9 HYPOTHYROIDISM, UNSPECIFIED TYPE: ICD-10-CM

## 2019-02-06 PROCEDURE — G0463 HOSPITAL OUTPT CLINIC VISIT: HCPCS | Performed by: FAMILY MEDICINE

## 2019-02-06 PROCEDURE — 99214 OFFICE O/P EST MOD 30 MIN: CPT | Performed by: FAMILY MEDICINE

## 2019-02-06 PROCEDURE — 96372 THER/PROPH/DIAG INJ SC/IM: CPT | Performed by: FAMILY MEDICINE

## 2019-02-06 RX ADMIN — CYANOCOBALAMIN 1000 MCG: 1000 INJECTION INTRAMUSCULAR; SUBCUTANEOUS at 12:30:00

## 2019-02-06 NOTE — PROGRESS NOTES
HPI:    Patient ID: Kimberli Fong is a 69year old female. HPI  Patient presents with: Follow - Up    Patient here with multiple complaints    1. Dizziness , feels head spinning x 2-3 monthsalso happens when laying, and worse with laying  2.  Urinary Disp: 90 tablet Rfl: 3   ergocalciferol 34204 units Oral Cap Take 1 capsule (50,000 Units total) by mouth every 7 days.  Disp: 12 capsule Rfl: 3   Lovastatin 20 MG Oral Tab TAKE 1 TABLET BY MOUTH EVERY NIGHT Disp: 90 tablet Rfl: 3   acetaminophen (TYLENOL) URINALYSIS WITH CULTURE REFLEX  - US KIDNEY/BLADDER (CPT=76770); Future    3. Nocturia    - OP REFERRAL TO UROGYNECOLOGY CLINIC  - US KIDNEY/BLADDER (FSJ=38731); Future    4. Hypothyroidism, unspecified type    - TSH W REFLEX TO FREE T4; Future    5.  Vitam

## 2019-04-22 ENCOUNTER — LAB ENCOUNTER (OUTPATIENT)
Dept: LAB | Age: 74
End: 2019-04-22
Attending: FAMILY MEDICINE
Payer: MEDICARE

## 2019-04-22 DIAGNOSIS — R42 DIZZINESS: ICD-10-CM

## 2019-04-22 DIAGNOSIS — E78.00 HYPERCHOLESTEREMIA: ICD-10-CM

## 2019-04-22 DIAGNOSIS — E53.8 VITAMIN B12 DEFICIENCY: ICD-10-CM

## 2019-04-22 DIAGNOSIS — E03.9 HYPOTHYROIDISM, UNSPECIFIED TYPE: ICD-10-CM

## 2019-04-22 PROCEDURE — 85025 COMPLETE CBC W/AUTO DIFF WBC: CPT

## 2019-04-22 PROCEDURE — 81001 URINALYSIS AUTO W/SCOPE: CPT | Performed by: FAMILY MEDICINE

## 2019-04-22 PROCEDURE — 36415 COLL VENOUS BLD VENIPUNCTURE: CPT

## 2019-04-22 PROCEDURE — 80053 COMPREHEN METABOLIC PANEL: CPT

## 2019-04-22 PROCEDURE — 82607 VITAMIN B-12: CPT

## 2019-04-22 PROCEDURE — 84443 ASSAY THYROID STIM HORMONE: CPT

## 2019-04-22 PROCEDURE — 80061 LIPID PANEL: CPT

## 2019-04-29 ENCOUNTER — OFFICE VISIT (OUTPATIENT)
Dept: FAMILY MEDICINE CLINIC | Facility: CLINIC | Age: 74
End: 2019-04-29
Payer: MEDICARE

## 2019-04-29 VITALS
DIASTOLIC BLOOD PRESSURE: 68 MMHG | BODY MASS INDEX: 21.43 KG/M2 | SYSTOLIC BLOOD PRESSURE: 107 MMHG | HEART RATE: 66 BPM | WEIGHT: 115 LBS | HEIGHT: 61.5 IN | TEMPERATURE: 98 F

## 2019-04-29 DIAGNOSIS — E03.9 HYPOTHYROIDISM, UNSPECIFIED TYPE: ICD-10-CM

## 2019-04-29 DIAGNOSIS — E55.9 VITAMIN D DEFICIENCY: ICD-10-CM

## 2019-04-29 DIAGNOSIS — E53.8 VITAMIN B12 DEFICIENCY: ICD-10-CM

## 2019-04-29 DIAGNOSIS — E78.00 HYPERCHOLESTEREMIA: Primary | ICD-10-CM

## 2019-04-29 PROCEDURE — 96372 THER/PROPH/DIAG INJ SC/IM: CPT | Performed by: FAMILY MEDICINE

## 2019-04-29 PROCEDURE — G0463 HOSPITAL OUTPT CLINIC VISIT: HCPCS | Performed by: FAMILY MEDICINE

## 2019-04-29 PROCEDURE — 99214 OFFICE O/P EST MOD 30 MIN: CPT | Performed by: FAMILY MEDICINE

## 2019-04-29 RX ADMIN — CYANOCOBALAMIN 1000 MCG: 1000 INJECTION INTRAMUSCULAR; SUBCUTANEOUS at 14:21:00

## 2019-04-29 NOTE — PROGRESS NOTES
HPI:    Patient ID: James Stapleton is a 69year old female. HPI  Patient presents with:  Lab Results  Headache: mostly on the right sise during the night   Tired    Needs b12 today  Last injection nov 2018.   Had labs done 4/22/19    Does admit to miss PHYSICAL EXAM:   Patient presents with:  Lab Results  Headache: mostly on the right sise during the night   Tired     Physical Exam   Constitutional: She is oriented to person, place, and time. She appears well-developed and well-nourished.    Eyes: Pupil

## 2019-06-14 DIAGNOSIS — E78.00 HYPERCHOLESTEREMIA: ICD-10-CM

## 2019-06-14 RX ORDER — ALPRAZOLAM 0.25 MG/1
TABLET ORAL
Qty: 30 TABLET | Refills: 0 | OUTPATIENT
Start: 2019-06-14 | End: 2019-10-16

## 2019-06-14 NOTE — TELEPHONE ENCOUNTER
Controlled medication pending for review. If approved needs to be called in or faxed by on-site staff.     Last Rx: 12/9/18  LOV: 4/29/19      Requested Prescriptions   Pending Prescriptions Disp Refills   • ALPRAZOLAM 0.25 MG Oral Tab [Pharmacy Med Name:

## 2019-06-14 NOTE — TELEPHONE ENCOUNTER
Clinical site staff please call in Rx and notify the patient, thank you.  Please respond to pool: DERIK [facility] & assisted LPN/QUIQUE

## 2019-06-15 RX ORDER — LOVASTATIN 20 MG/1
TABLET ORAL
Qty: 90 TABLET | Refills: 1 | Status: SHIPPED | OUTPATIENT
Start: 2019-06-15 | End: 2019-11-12

## 2019-06-15 NOTE — TELEPHONE ENCOUNTER
LR 7-8-18 # 90 + 3    Review pended refill request as it does not fall under a protocol.   Requested Prescriptions     Pending Prescriptions Disp Refills   • LOVASTATIN 20 MG Oral Tab [Pharmacy Med Name: LOVASTATIN 20MG TABLETS] 90 tablet 0     Sig: TAKE 1

## 2019-06-17 NOTE — PROGRESS NOTES
HPI:    Patient ID: Nury Gatica is a 69year old female. HPI  Patient presents with:   Follow - Up: on headaches pt states she still gets a headache mostly on her left side   Other: pt states she forgets things easly   Urinary Frequency      Patient every 8 (eight) hours as needed. Disp:  Rfl:    Multiple Vitamin (MULTI-VITAMINS) Oral Tab Take 1 tablet by mouth once daily.  Take with food Disp:  Rfl:      Allergies:  Penicillins             HIVES, RASH  Sulfa Antibiotics       RASH  Sulfanilamide

## 2019-07-10 RX ORDER — LEVOTHYROXINE SODIUM 0.05 MG/1
TABLET ORAL
Qty: 90 TABLET | Refills: 1 | Status: SHIPPED | OUTPATIENT
Start: 2019-07-10 | End: 2019-11-12

## 2019-07-10 NOTE — TELEPHONE ENCOUNTER
Refill passed per [facility] protocol.   Hypothyroid Medications  Protocol Criteria:  Appointment scheduled in the past 12 months or the next 3 months  TSH resulted in the past 12 months that is normal  Recent Outpatient Visits            3 weeks ago A

## 2019-08-15 ENCOUNTER — TELEPHONE (OUTPATIENT)
Dept: FAMILY MEDICINE CLINIC | Facility: CLINIC | Age: 74
End: 2019-08-15

## 2019-08-15 NOTE — TELEPHONE ENCOUNTER
Pt's son is calling would like to know if pt can be seen for a follow up on Monday per son he is scheduled to come in at 11:45 and wants his mom to be seen as well. Please Advise.

## 2019-08-19 ENCOUNTER — OFFICE VISIT (OUTPATIENT)
Dept: FAMILY MEDICINE CLINIC | Facility: CLINIC | Age: 74
End: 2019-08-19
Payer: MEDICARE

## 2019-08-19 VITALS
BODY MASS INDEX: 21.25 KG/M2 | DIASTOLIC BLOOD PRESSURE: 56 MMHG | WEIGHT: 114 LBS | SYSTOLIC BLOOD PRESSURE: 71 MMHG | HEIGHT: 61.5 IN | HEART RATE: 77 BPM | TEMPERATURE: 98 F

## 2019-08-19 DIAGNOSIS — E53.8 VITAMIN B12 DEFICIENCY: ICD-10-CM

## 2019-08-19 DIAGNOSIS — E55.9 VITAMIN D DEFICIENCY: ICD-10-CM

## 2019-08-19 DIAGNOSIS — R53.1 WEAKNESS: ICD-10-CM

## 2019-08-19 DIAGNOSIS — M79.10 MYALGIA: ICD-10-CM

## 2019-08-19 DIAGNOSIS — E03.9 HYPOTHYROIDISM, UNSPECIFIED TYPE: ICD-10-CM

## 2019-08-19 DIAGNOSIS — R51.9 RIGHT-SIDED HEADACHE: Primary | ICD-10-CM

## 2019-08-19 PROCEDURE — 99214 OFFICE O/P EST MOD 30 MIN: CPT | Performed by: FAMILY MEDICINE

## 2019-08-19 PROCEDURE — G0463 HOSPITAL OUTPT CLINIC VISIT: HCPCS | Performed by: FAMILY MEDICINE

## 2019-08-19 NOTE — PROGRESS NOTES
HPI:    Patient ID: Henry Gruber is a 69year old female.     HPI  Patient presents with:  Headache: X 1 month mostly on right side   Joint Pain: getting worst   Tired  Urinary Frequency    Patient here for follow up  C/o pain right side headache tiffanie at tablets by mouth every 8 (eight) hours as needed. Disp:  Rfl:    Multiple Vitamin (MULTI-VITAMINS) Oral Tab Take 1 tablet by mouth once daily.  Take with food Disp:  Rfl:      Allergies:  Penicillins             HIVES, RASH  Sulfa Antibiotics       RASH  [place] 25-HYDROXY; Future    6.  Hypothyroidism, unspecified type    - TSH W REFLEX TO FREE T4; Future      Orders Placed This Encounter      CBC W Differential W Platelet [E]      Comp Metabolic Panel (14) [E]      TSH W Reflex To Free T4 [E]      Vitamin B12 [E]

## 2019-10-17 RX ORDER — SERTRALINE HYDROCHLORIDE 100 MG/1
TABLET, FILM COATED ORAL
Qty: 90 TABLET | Refills: 1 | Status: SHIPPED | OUTPATIENT
Start: 2019-10-17 | End: 2020-04-05

## 2019-10-17 RX ORDER — ALPRAZOLAM 0.25 MG/1
TABLET ORAL
Qty: 30 TABLET | Refills: 1 | Status: SHIPPED
Start: 2019-10-17 | End: 2020-04-05

## 2019-10-17 RX ORDER — BETAMETHASONE DIPROPIONATE 0.5 MG/G
CREAM TOPICAL
Qty: 15 G | Refills: 1 | Status: SHIPPED | OUTPATIENT
Start: 2019-10-17 | End: 2020-04-05

## 2019-11-07 ENCOUNTER — OFFICE VISIT (OUTPATIENT)
Dept: FAMILY MEDICINE CLINIC | Facility: CLINIC | Age: 74
End: 2019-11-07
Payer: MEDICARE

## 2019-11-07 ENCOUNTER — LAB ENCOUNTER (OUTPATIENT)
Dept: LAB | Age: 74
End: 2019-11-07
Attending: FAMILY MEDICINE
Payer: MEDICARE

## 2019-11-07 ENCOUNTER — APPOINTMENT (OUTPATIENT)
Dept: LAB | Age: 74
End: 2019-11-07
Attending: FAMILY MEDICINE
Payer: MEDICARE

## 2019-11-07 VITALS
BODY MASS INDEX: 21.06 KG/M2 | TEMPERATURE: 98 F | DIASTOLIC BLOOD PRESSURE: 62 MMHG | WEIGHT: 113 LBS | HEIGHT: 61.5 IN | HEART RATE: 97 BPM | SYSTOLIC BLOOD PRESSURE: 99 MMHG

## 2019-11-07 DIAGNOSIS — R53.1 WEAKNESS: ICD-10-CM

## 2019-11-07 DIAGNOSIS — M79.10 MYALGIA: ICD-10-CM

## 2019-11-07 DIAGNOSIS — R51.9 RIGHT-SIDED HEADACHE: ICD-10-CM

## 2019-11-07 DIAGNOSIS — R07.89 CHEST PRESSURE: ICD-10-CM

## 2019-11-07 DIAGNOSIS — R68.89 ILL FEELING: Primary | ICD-10-CM

## 2019-11-07 DIAGNOSIS — E53.8 VITAMIN B12 DEFICIENCY: ICD-10-CM

## 2019-11-07 DIAGNOSIS — R68.89 ILL FEELING: ICD-10-CM

## 2019-11-07 DIAGNOSIS — E55.9 VITAMIN D DEFICIENCY: ICD-10-CM

## 2019-11-07 DIAGNOSIS — E03.9 HYPOTHYROIDISM, UNSPECIFIED TYPE: ICD-10-CM

## 2019-11-07 DIAGNOSIS — E78.00 HYPERCHOLESTEREMIA: ICD-10-CM

## 2019-11-07 PROCEDURE — 82306 VITAMIN D 25 HYDROXY: CPT

## 2019-11-07 PROCEDURE — 80061 LIPID PANEL: CPT

## 2019-11-07 PROCEDURE — 36415 COLL VENOUS BLD VENIPUNCTURE: CPT

## 2019-11-07 PROCEDURE — 93005 ELECTROCARDIOGRAM TRACING: CPT

## 2019-11-07 PROCEDURE — 86038 ANTINUCLEAR ANTIBODIES: CPT

## 2019-11-07 PROCEDURE — 96372 THER/PROPH/DIAG INJ SC/IM: CPT | Performed by: FAMILY MEDICINE

## 2019-11-07 PROCEDURE — 85025 COMPLETE CBC W/AUTO DIFF WBC: CPT

## 2019-11-07 PROCEDURE — 99214 OFFICE O/P EST MOD 30 MIN: CPT | Performed by: FAMILY MEDICINE

## 2019-11-07 PROCEDURE — 80053 COMPREHEN METABOLIC PANEL: CPT

## 2019-11-07 PROCEDURE — 85652 RBC SED RATE AUTOMATED: CPT

## 2019-11-07 PROCEDURE — 82607 VITAMIN B-12: CPT

## 2019-11-07 PROCEDURE — 87086 URINE CULTURE/COLONY COUNT: CPT

## 2019-11-07 PROCEDURE — 86431 RHEUMATOID FACTOR QUANT: CPT

## 2019-11-07 PROCEDURE — 93010 ELECTROCARDIOGRAM REPORT: CPT | Performed by: FAMILY MEDICINE

## 2019-11-07 PROCEDURE — 84443 ASSAY THYROID STIM HORMONE: CPT

## 2019-11-07 PROCEDURE — 86039 ANTINUCLEAR ANTIBODIES (ANA): CPT

## 2019-11-07 PROCEDURE — G0463 HOSPITAL OUTPT CLINIC VISIT: HCPCS | Performed by: FAMILY MEDICINE

## 2019-11-07 RX ADMIN — CYANOCOBALAMIN 1000 MCG: 1000 INJECTION INTRAMUSCULAR; SUBCUTANEOUS at 14:34:00

## 2019-11-07 NOTE — PROGRESS NOTES
HPI:    Patient ID: Diana Camara is a 69year old female.     HPI  Patient presents with:  Urinary Frequency: mostly during the nights also  gets dizzy right away after getting out of bed  and gets heart palpitations   Joint Pain: in hands and knees units Oral Cap Take 1 capsule (50,000 Units total) by mouth every 7 days. 12 capsule 3   • acetaminophen (TYLENOL) 325 MG Oral Tab Take 2 tablets by mouth every 8 (eight) hours as needed.      • Multiple Vitamin (MULTI-VITAMINS) Oral Tab Take 1 tablet by mo

## 2019-11-11 ENCOUNTER — TELEPHONE (OUTPATIENT)
Dept: FAMILY MEDICINE CLINIC | Facility: CLINIC | Age: 74
End: 2019-11-11

## 2019-11-11 DIAGNOSIS — E78.00 HYPERCHOLESTEREMIA: ICD-10-CM

## 2019-11-11 NOTE — TELEPHONE ENCOUNTER
Son calling regarding labs done on 11/7/19.      Dr. Leo Marcano:     Please review lab results and advise

## 2019-11-12 RX ORDER — LEVOTHYROXINE SODIUM 0.05 MG/1
50 TABLET ORAL
Qty: 90 TABLET | Refills: 3 | Status: SHIPPED | OUTPATIENT
Start: 2019-11-12 | End: 2020-12-10

## 2019-11-12 RX ORDER — LOVASTATIN 20 MG/1
TABLET ORAL
Qty: 90 TABLET | Refills: 3 | Status: SHIPPED | OUTPATIENT
Start: 2019-11-12 | End: 2020-12-10

## 2019-12-04 ENCOUNTER — OFFICE VISIT (OUTPATIENT)
Dept: FAMILY MEDICINE CLINIC | Facility: CLINIC | Age: 74
End: 2019-12-04
Payer: MEDICARE

## 2019-12-04 VITALS
SYSTOLIC BLOOD PRESSURE: 102 MMHG | WEIGHT: 116 LBS | BODY MASS INDEX: 21.62 KG/M2 | TEMPERATURE: 98 F | HEART RATE: 71 BPM | HEIGHT: 61.5 IN | DIASTOLIC BLOOD PRESSURE: 68 MMHG

## 2019-12-04 DIAGNOSIS — R42 DIZZINESS: ICD-10-CM

## 2019-12-04 DIAGNOSIS — R00.2 PALPITATION: ICD-10-CM

## 2019-12-04 DIAGNOSIS — E53.8 VITAMIN B12 DEFICIENCY: ICD-10-CM

## 2019-12-04 DIAGNOSIS — R51.9 RIGHT-SIDED HEADACHE: Primary | ICD-10-CM

## 2019-12-04 DIAGNOSIS — R76.8 ANA POSITIVE: ICD-10-CM

## 2019-12-04 DIAGNOSIS — R53.1 RIGHT SIDED WEAKNESS: ICD-10-CM

## 2019-12-04 PROCEDURE — 96372 THER/PROPH/DIAG INJ SC/IM: CPT | Performed by: FAMILY MEDICINE

## 2019-12-04 PROCEDURE — 99214 OFFICE O/P EST MOD 30 MIN: CPT | Performed by: FAMILY MEDICINE

## 2019-12-04 PROCEDURE — G0463 HOSPITAL OUTPT CLINIC VISIT: HCPCS | Performed by: FAMILY MEDICINE

## 2019-12-04 RX ADMIN — CYANOCOBALAMIN 1000 MCG: 1000 INJECTION INTRAMUSCULAR; SUBCUTANEOUS at 13:16:00

## 2019-12-04 NOTE — PROGRESS NOTES
HPI:    Patient ID: David Dickson is a 69year old female. HPI  Patient presents with:   Follow - Up: on urinary frequency and dizziness pt states she still gets dizzy and feels numbness on all of her right side of her body when waking up   Palpitatio ergocalciferol 37276 units Oral Cap Take 1 capsule (50,000 Units total) by mouth every 7 days. 12 capsule 3   • acetaminophen (TYLENOL) 325 MG Oral Tab Take 2 tablets by mouth every 8 (eight) hours as needed.      • Multiple Vitamin (MULTI-VITAMINS) Oral Ta emergency room. Patient states that she feels better and she has a trip planned to go see her daughters in [place]. - US CAROTID DOPPLER BILAT - DIAG IMG (CPT=93880); Future  - CARD ECHO 2D DOPPLER (CPT=93306); Future  - NEURO - INTERNAL    2.  Right s

## 2019-12-27 ENCOUNTER — HOSPITAL ENCOUNTER (OUTPATIENT)
Dept: ULTRASOUND IMAGING | Age: 74
Discharge: HOME | End: 2019-12-27
Attending: FAMILY MEDICINE
Payer: MEDICARE

## 2019-12-27 DIAGNOSIS — R51.9 RIGHT-SIDED HEADACHE: ICD-10-CM

## 2019-12-27 DIAGNOSIS — R53.1 RIGHT SIDED WEAKNESS: ICD-10-CM

## 2019-12-27 DIAGNOSIS — R76.8 ANA POSITIVE: ICD-10-CM

## 2019-12-27 DIAGNOSIS — R00.2 PALPITATION: ICD-10-CM

## 2019-12-27 DIAGNOSIS — R42 DIZZINESS: ICD-10-CM

## 2019-12-27 PROCEDURE — 93880 EXTRACRANIAL BILAT STUDY: CPT | Performed by: FAMILY MEDICINE

## 2020-01-22 ENCOUNTER — HOSPITAL ENCOUNTER (OUTPATIENT)
Dept: CT IMAGING | Age: 75
Discharge: HOME | End: 2020-01-22
Attending: FAMILY MEDICINE
Payer: MEDICARE

## 2020-01-22 DIAGNOSIS — R51.9 RIGHT-SIDED HEADACHE: ICD-10-CM

## 2020-01-22 PROCEDURE — 70450 CT HEAD/BRAIN W/O DYE: CPT | Performed by: FAMILY MEDICINE

## 2020-01-28 ENCOUNTER — OFFICE VISIT (OUTPATIENT)
Dept: NEUROLOGY | Facility: CLINIC | Age: 75
End: 2020-01-28
Payer: MEDICARE

## 2020-01-28 VITALS — WEIGHT: 100 LBS | HEIGHT: 61 IN | BODY MASS INDEX: 18.88 KG/M2

## 2020-01-28 DIAGNOSIS — R20.0 NUMBNESS AND TINGLING IN BOTH HANDS: ICD-10-CM

## 2020-01-28 DIAGNOSIS — R42 DIZZINESS: ICD-10-CM

## 2020-01-28 DIAGNOSIS — R20.2 NUMBNESS AND TINGLING IN BOTH HANDS: ICD-10-CM

## 2020-01-28 DIAGNOSIS — R51.9 CHRONIC DAILY HEADACHE: ICD-10-CM

## 2020-01-28 DIAGNOSIS — R29.898 WEAKNESS OF BOTH LEGS: ICD-10-CM

## 2020-01-28 PROCEDURE — 99204 OFFICE O/P NEW MOD 45 MIN: CPT | Performed by: OTHER

## 2020-01-28 NOTE — PROGRESS NOTES
HPI:    Patient ID: Henry Gruber is a 69year old female. PCP: Dr Mitzi Salter  Thank you for requesting this consultation to us.  Below is the summary of my evaluation    HPI  Krishna Alfredo is a 69year old female with history of hypertension, hypothyroidism HENT: Negative. Eyes: Negative. Respiratory: Negative. Cardiovascular: Negative. Gastrointestinal: Negative. Endocrine: Negative. Genitourinary: Negative. Musculoskeletal: Negative. Skin: Negative.     Allergic/Immunologic: Silva Negron affect. Neurological  Mental status: Awake, alert and oriented to time, place and person. Speech is fluent with intact comprehension, repetition and naming. Memory is intact.   Cranial nerves:   II, III, IV, VI :Pupils round, equal and reactive to light your patient's care. Please do not hesitate to call if you have any questions.        Samuel Rojas MD  [facility] This Visit:  Requested Prescriptions      No prescriptions requested or ordered in this encounter       Imagi

## 2020-02-01 ENCOUNTER — OFFICE VISIT (OUTPATIENT)
Dept: RHEUMATOLOGY | Facility: CLINIC | Age: 75
End: 2020-02-01
Payer: MEDICARE

## 2020-02-01 VITALS
SYSTOLIC BLOOD PRESSURE: 100 MMHG | WEIGHT: 116.69 LBS | HEART RATE: 73 BPM | BODY MASS INDEX: 22.03 KG/M2 | DIASTOLIC BLOOD PRESSURE: 64 MMHG | HEIGHT: 61 IN

## 2020-02-01 DIAGNOSIS — R76.8 POSITIVE ANA (ANTINUCLEAR ANTIBODY): ICD-10-CM

## 2020-02-01 DIAGNOSIS — M25.50 POLYARTHRALGIA: Primary | ICD-10-CM

## 2020-02-01 PROCEDURE — G0463 HOSPITAL OUTPT CLINIC VISIT: HCPCS | Performed by: INTERNAL MEDICINE

## 2020-02-01 PROCEDURE — 99204 OFFICE O/P NEW MOD 45 MIN: CPT | Performed by: INTERNAL MEDICINE

## 2020-02-01 NOTE — PATIENT INSTRUCTIONS
You were seen today for joint pain looks like osteoarthritis  Now you can take tylenol arthritis 500 mg twice a day or even 1000 mg twice a day, no more than 3 grams a day  Blood work today    Get posture corrector to help with posture

## 2020-02-01 NOTE — PROGRESS NOTES
Sasha Barr is a 69year old female who presents for Patient presents with:  Abnormal Labs: GULSHAN +  Joint Pain: shoulders, wrists, fingers, feet, toes, knees   .    HPI:   CC: +GULSHAN  Consult: referred by PCP Dr. Mita Rolle    This is a 68 yo F with hx of HLD, daily.  Take with food        Past Medical History:   Diagnosis Date   • Arthritis    • Dry eye syndrome of lacrimal gland, bilateral 7/11/2018   • Essential hypertension    • Hypercholesterolemia    • Pseudophakia of both eyes 7/11/2018   • PVD (posterior +BS, no HSM  SKIN: No rashes or skin lesions. No nail findings  MSK:  Poor posture   No evidence of swelling or synovitis on exam, +FROM in all joints  NEURO: Cranial nerves II-XII intact grossly.  5/5 strength throughout in both upper and lower extremities 298 (H)   eGFR NON-AFR.  AMERICAN      >=60 53 (L)   eGFR       >=60 62   ALT (SGPT)      13 - 56 U/L 15   AST (SGOT)      15 - 37 U/L 17   ALKALINE PHOSPHATASE      55 - 142 U/L 110   Total Bilirubin      0.1 - 2.0 mg/dL 0.4   TOTAL PROTEIN

## 2020-02-10 ENCOUNTER — NURSE ONLY (OUTPATIENT)
Dept: FAMILY MEDICINE CLINIC | Facility: CLINIC | Age: 75
End: 2020-02-10
Payer: MEDICARE

## 2020-02-10 DIAGNOSIS — E53.8 VITAMIN B12 DEFICIENCY: Primary | ICD-10-CM

## 2020-02-10 PROCEDURE — 96372 THER/PROPH/DIAG INJ SC/IM: CPT | Performed by: FAMILY MEDICINE

## 2020-02-10 RX ADMIN — CYANOCOBALAMIN 1000 MCG: 1000 INJECTION INTRAMUSCULAR; SUBCUTANEOUS at 11:29:00

## 2020-02-10 NOTE — PROGRESS NOTES
Patient came in today for b12 injection. Name and  verified. Injection given on left deltoid. Injection was tolerated well. Patient left in good condition.

## 2020-02-29 ENCOUNTER — APPOINTMENT (OUTPATIENT)
Dept: LAB | Age: 75
End: 2020-02-29
Attending: INTERNAL MEDICINE
Payer: MEDICARE

## 2020-02-29 ENCOUNTER — TELEPHONE (OUTPATIENT)
Dept: OTHER | Age: 75
End: 2020-02-29

## 2020-02-29 LAB
CRP SERPL-MCNC: 0.35 MG/DL (ref ?–0.3)
ERYTHROCYTE [SEDIMENTATION RATE] IN BLOOD: 12 MM/HR (ref 0–30)
RHEUMATOID FACT SERPL-ACNC: <10 IU/ML (ref ?–15)

## 2020-02-29 PROCEDURE — 36415 COLL VENOUS BLD VENIPUNCTURE: CPT | Performed by: INTERNAL MEDICINE

## 2020-02-29 PROCEDURE — 86200 CCP ANTIBODY: CPT | Performed by: INTERNAL MEDICINE

## 2020-02-29 PROCEDURE — 85652 RBC SED RATE AUTOMATED: CPT | Performed by: INTERNAL MEDICINE

## 2020-02-29 PROCEDURE — 86140 C-REACTIVE PROTEIN: CPT | Performed by: INTERNAL MEDICINE

## 2020-02-29 PROCEDURE — 86431 RHEUMATOID FACTOR QUANT: CPT | Performed by: INTERNAL MEDICINE

## 2020-02-29 NOTE — TELEPHONE ENCOUNTER
Advised patient's son Justin Villagran (on [place]) of Dr. Annis Eisenmenger. Son verbalized understanding order is for Echocardiogram and Holter Monitor. Number provided for scheduling.

## 2020-02-29 NOTE — TELEPHONE ENCOUNTER
Received call from registration. States pt is there for blood work and EKG. Requesting order for EKG. No order in the system for EKG. Per last OV note Plan is below. Will send to provider to review. Verbalized understanding.           ASSESSMENT/PLAN:   [employer] Telephone Encounter by Sylvia Murphy at 03/13/17 09:53 AM     Author:  Sylvia Murphy Service:  (none) Author Type:  Patient      Filed:  03/13/17 09:54 AM Encounter Date:  3/13/2017 Status:  Signed     :  Sylvia Murphy (Patient )              CATRACHITO GARNICA    Patient Age: 82 year old    ACCT STATUS: COPAY  MESSAGE:[GB1.1T]   Patient calling for lab results. Please Advise.[GB1.1M] Message confirmed with caller. Patient transferred to ENT Nurse Triage queue.   Next and Last Visit with Provider and Department  Next visit with MICHAEL RUELAS is on 03/24/2017 at  9:30 AM in ENT/AUDIOLOGY HLD  Next visit with EAR/NOSE/THROAT DEPT is on 03/24/2017 at  9:30 AM in ENT/AUDIOLOGY HLD  Last visit with MICHAEL RUELAS was on 03/10/2017 at 11:00 AM in ENT/AUDIOLOGY HLD  Last visit with EAR/NOSE/THROAT DEPT was on 03/10/2017 at 11:00 AM in ENT/AUDIOLOGY HLD     WEIGHT AND HEIGHT: As of 03/03/2017 weight is 143 lbs.(64.864 kg). Height is 5' 2.75\"(1.594 m).   BMI is 25.53 kg/(m^2) calculated from:     Height 5' 2.75\" (1.594 m) as of 3/3/17     Weight 143 lb (64.864 kg) as of 3/3/17      No Known Allergies  Current outpatient prescriptions       Medication  Sig Dispense Refill   • cephALEXin (KEFLEX) 500 MG Cap Take 1 capsule by mouth four times daily for 7 days 28 Cap 0   • ezetimibe (ZETIA) 10 MG tablet Take 1 Tab by mouth daily. 90 Tab 1   • atorvastatin (LIPITOR) 20 MG tablet Take 1 Tab by mouth daily. 90 Tab 1   • atenolol (TENORMIN) 50 MG tablet Take 1 Tab by mouth daily. 90 Tab 1   • Temazepam 30 MG CAPS Take 30 mg by mouth nightly as needed. For sleep. 90 Cap 1   • carbamazepine (TEGRETOL) 200 MG tablet Take 2 Tabs by mouth 3 (three) times daily. 180 Tab 1   • tramadol (ULTRAM) 50 MG tablet Take 1 Tab by mouth every 6 (six) hours as needed for Pain. 30 Tab 0   • Naproxen Sodium (ALEVE OR) Take  by mouth.        PHARMACY to use: [facility]           Pharmacy preference(s) on file:    [facility]    CALL BACK INFO:[GB1.1T] Ok to leave response (including medical information) with family member or on answering machine[GB1.1M]  ROUTING:[GB1.1T] Patient's physician/staff[GB1.1M]        PCP: Kizzy Bishop MD         INS: Payor: [employer] MEDICARE O FFS / Plan: N/A / Product Type: *No Product type* / Note: This is the primary coverage, but no account was found for this location or the patient's primary location.   ADDRESS:  [address] [place] [id number][GB1.1T]       Revision History        User Key Date/Time User Provider Type Action    > GB1.1 03/13/17 09:54 AM Sylvia Murphy Patient  Sign    M - Manual, T - Template

## 2020-02-29 NOTE — TELEPHONE ENCOUNTER
Left message to call back, number is for Yayo (son-SPRING), clarify Dr Lashawn Small orders when call is returned.

## 2020-03-02 LAB — CCP IGG SERPL-ACNC: 0.8 U/ML (ref 0–6.9)

## 2020-03-10 ENCOUNTER — TELEPHONE (OUTPATIENT)
Dept: FAMILY MEDICINE CLINIC | Facility: CLINIC | Age: 75
End: 2020-03-10

## 2020-03-10 NOTE — TELEPHONE ENCOUNTER
Patient son called stating he is bringing patient in tomorrow for her Vit B12 injection and wants to make sure he has an order on file. Please place an order for Vit B12 injection.

## 2020-03-11 ENCOUNTER — NURSE ONLY (OUTPATIENT)
Dept: FAMILY MEDICINE CLINIC | Facility: CLINIC | Age: 75
End: 2020-03-11
Payer: MEDICARE

## 2020-03-11 DIAGNOSIS — E53.8 VITAMIN B12 DEFICIENCY: Primary | ICD-10-CM

## 2020-03-11 PROCEDURE — 96372 THER/PROPH/DIAG INJ SC/IM: CPT | Performed by: FAMILY MEDICINE

## 2020-03-11 RX ADMIN — CYANOCOBALAMIN 1000 MCG: 1000 INJECTION INTRAMUSCULAR; SUBCUTANEOUS at 09:23:00

## 2020-03-20 ENCOUNTER — TELEPHONE (OUTPATIENT)
Dept: FAMILY MEDICINE CLINIC | Facility: CLINIC | Age: 75
End: 2020-03-20

## 2020-03-20 NOTE — TELEPHONE ENCOUNTER
Asking if echocardiogram can be cancelled scheduled 3/23/20 at 5 pm.    Please call [facility] and let them know you may leave a      [facility] phone # [phone] will be open until 7 pm tonight.

## 2020-04-05 RX ORDER — BETAMETHASONE DIPROPIONATE 0.5 MG/G
CREAM TOPICAL
Qty: 15 G | Refills: 1 | Status: SHIPPED | OUTPATIENT
Start: 2020-04-05 | End: 2020-09-19

## 2020-04-05 RX ORDER — ALPRAZOLAM 0.25 MG/1
TABLET ORAL
Qty: 30 TABLET | Refills: 0 | Status: SHIPPED | OUTPATIENT
Start: 2020-04-05

## 2020-04-05 RX ORDER — SERTRALINE HYDROCHLORIDE 100 MG/1
TABLET, FILM COATED ORAL
Qty: 90 TABLET | Refills: 1 | Status: SHIPPED | OUTPATIENT
Start: 2020-04-05 | End: 2020-12-10

## 2020-07-27 ENCOUNTER — NURSE TRIAGE (OUTPATIENT)
Dept: FAMILY MEDICINE CLINIC | Facility: CLINIC | Age: 75
End: 2020-07-27

## 2020-07-27 NOTE — TELEPHONE ENCOUNTER
Action Requested: Summary for Provider     []  Critical Lab, Recommendations Needed  [] Need Additional Advice  []   FYI    []   Need Orders  [] Need Medications Sent to Pharmacy  []  Other     SUMMARY:     Spoke to son Oli Romero, on SPRING.  States that patient

## 2020-07-30 ENCOUNTER — TELEMEDICINE (OUTPATIENT)
Dept: FAMILY MEDICINE CLINIC | Facility: CLINIC | Age: 75
End: 2020-07-30
Payer: MEDICARE

## 2020-07-30 DIAGNOSIS — R35.1 NOCTURIA: ICD-10-CM

## 2020-07-30 DIAGNOSIS — E78.00 HYPERCHOLESTEREMIA: ICD-10-CM

## 2020-07-30 DIAGNOSIS — R10.2 PELVIC PAIN: ICD-10-CM

## 2020-07-30 DIAGNOSIS — E03.9 HYPOTHYROIDISM, UNSPECIFIED TYPE: ICD-10-CM

## 2020-07-30 DIAGNOSIS — E53.8 VITAMIN B12 DEFICIENCY: ICD-10-CM

## 2020-07-30 DIAGNOSIS — E55.9 VITAMIN D DEFICIENCY: ICD-10-CM

## 2020-07-30 DIAGNOSIS — R35.0 URINARY FREQUENCY: ICD-10-CM

## 2020-07-30 DIAGNOSIS — R42 LIGHTHEADEDNESS: Primary | ICD-10-CM

## 2020-07-30 PROCEDURE — 99214 OFFICE O/P EST MOD 30 MIN: CPT | Performed by: FAMILY MEDICINE

## 2020-07-30 NOTE — PATIENT INSTRUCTIONS
1. Please get fasting blood work and urine tests, MRI cervical spine, MRI brain, kidney/ bladder ultrasound and see urogynecology.

## 2020-07-30 NOTE — PROGRESS NOTES
This visit is conducted using Telemedicine with live, interactive video and audio during this Coronavirus pandemic. Please note that the following visit was completed using two-way, real-time interactive audio and/or video communication.   This has been telehealth platform. The patient was made aware of where to find [facility] notice of privacy practices, telehealth consent form and other related consent forms and documents. which are located on the [facility] website.  The patient verbally agreed to telehealth co to her and her son. They do not have an exact value but she seems to look about the same. Denies any fever chills. She has some lower pelvic pain when she has to go to the restroom. Denies any constipation.     Denies any blood in stool or black stool 3  •  ergocalciferol 85136 units Oral Cap, Take 1 capsule (50,000 Units total) by mouth every 7 days. , Disp: 12 capsule, Rfl: 3  •  acetaminophen (TYLENOL) 325 MG Oral Tab, Take 2 tablets by mouth every 8 (eight) hours as needed. , Disp: , Rfl:   •  Multipl REFERRAL TO UROGYNECOLOGY CLINIC    4. Pelvic pain    - US KIDNEY/BLADDER (CPT=76770); Future  - OP REFERRAL TO UROGYNECOLOGY CLINIC    5. Vitamin B12 deficiency    - VITAMIN B12; Future    6. Hypothyroidism, unspecified type    - TSH+FREE T4; Future    7.

## 2020-09-19 RX ORDER — BETAMETHASONE DIPROPIONATE 0.5 MG/G
CREAM TOPICAL
Qty: 15 G | Refills: 1 | Status: SHIPPED | OUTPATIENT
Start: 2020-09-19 | End: 2020-10-03

## 2020-10-03 RX ORDER — BETAMETHASONE DIPROPIONATE 0.5 MG/G
CREAM TOPICAL
Qty: 15 G | Refills: 1 | Status: SHIPPED | OUTPATIENT
Start: 2020-10-03 | End: 2021-03-08

## 2020-11-27 ENCOUNTER — TELEPHONE (OUTPATIENT)
Dept: FAMILY MEDICINE CLINIC | Facility: CLINIC | Age: 75
End: 2020-11-27

## 2020-11-27 NOTE — TELEPHONE ENCOUNTER
Son requesting a follow up virtual visit with Dr. Morenita Cat for migraines. Scheduled virtual for 11/30 at 3:45 with Dr. Morenita Cat.

## 2020-11-30 NOTE — PROGRESS NOTES
This visit is conducted using Telemedicine with live, interactive video and audio during this Coronavirus pandemic. Please note that the following visit was completed using two-way, real-time interactive audio and/or video communication.   This has been consent form, related consents and the risks discussed. Lastly, the patient confirmed that [place] were in [place]. Included in this visit, time may have been spent reviewing labs, medications, radiology tests and decision making.  Appropriate medical de Anxiety     Diverticulosis of small intestine without hemorrhage     Anxiety and depression     Vitamin B12 deficiency     Hypercholesteremia     Urinary frequency     Hair loss     Other insomnia     Dyspnea on exertion     Pseudophakia of both eyes     P RASH  Sulfa Antibiotics       RASH  Sulfanilamide           HIVES    PHYSICAL EXAM:     Vitals signs taken at home if available: There were no vitals taken for this visit.       Limited examination for this telephone/ video visit due to the coronavirus e Prescriptions     Signed Prescriptions Disp Refills   • Sertraline HCl 50 MG Oral Tab 90 tablet 1     Sig: Take 1 tablet (50 mg total) by mouth daily. Imaging & Referrals:  None        Leon Coe MD    11/30/2020  3:53 PM

## 2020-12-02 ENCOUNTER — TELEPHONE (OUTPATIENT)
Dept: FAMILY MEDICINE CLINIC | Facility: CLINIC | Age: 75
End: 2020-12-02

## 2020-12-02 DIAGNOSIS — E53.8 VITAMIN B12 DEFICIENCY: Primary | ICD-10-CM

## 2020-12-02 RX ORDER — CYANOCOBALAMIN 1000 UG/ML
1000 INJECTION INTRAMUSCULAR; SUBCUTANEOUS
Status: SHIPPED | OUTPATIENT
Start: 2020-12-03 | End: 2021-11-28

## 2020-12-02 NOTE — TELEPHONE ENCOUNTER
Pt son Telma Hines requesting to speak to Dr Remonia Kehr directly in regards to virtual visit pt had recently. Pt son did not want to give RN any further information.

## 2020-12-03 ENCOUNTER — LAB ENCOUNTER (OUTPATIENT)
Dept: LAB | Age: 75
End: 2020-12-03
Attending: FAMILY MEDICINE
Payer: MEDICARE

## 2020-12-03 DIAGNOSIS — E53.8 VITAMIN B12 DEFICIENCY: ICD-10-CM

## 2020-12-03 DIAGNOSIS — E03.9 HYPOTHYROIDISM, UNSPECIFIED TYPE: ICD-10-CM

## 2020-12-03 DIAGNOSIS — R42 LIGHTHEADEDNESS: ICD-10-CM

## 2020-12-03 DIAGNOSIS — E55.9 VITAMIN D DEFICIENCY: ICD-10-CM

## 2020-12-03 DIAGNOSIS — R73.9 HYPERGLYCEMIA: ICD-10-CM

## 2020-12-03 DIAGNOSIS — E78.00 HYPERCHOLESTEREMIA: ICD-10-CM

## 2020-12-03 PROCEDURE — 83036 HEMOGLOBIN GLYCOSYLATED A1C: CPT

## 2020-12-03 PROCEDURE — 84439 ASSAY OF FREE THYROXINE: CPT

## 2020-12-03 PROCEDURE — 82607 VITAMIN B-12: CPT

## 2020-12-03 PROCEDURE — 84443 ASSAY THYROID STIM HORMONE: CPT

## 2020-12-03 PROCEDURE — 85025 COMPLETE CBC W/AUTO DIFF WBC: CPT

## 2020-12-03 PROCEDURE — 80061 LIPID PANEL: CPT

## 2020-12-03 PROCEDURE — 81003 URINALYSIS AUTO W/O SCOPE: CPT | Performed by: FAMILY MEDICINE

## 2020-12-03 PROCEDURE — 36415 COLL VENOUS BLD VENIPUNCTURE: CPT

## 2020-12-03 PROCEDURE — 82306 VITAMIN D 25 HYDROXY: CPT

## 2020-12-03 PROCEDURE — 80053 COMPREHEN METABOLIC PANEL: CPT

## 2020-12-08 ENCOUNTER — NURSE ONLY (OUTPATIENT)
Dept: FAMILY MEDICINE CLINIC | Facility: CLINIC | Age: 75
End: 2020-12-08
Payer: MEDICARE

## 2020-12-08 DIAGNOSIS — E53.8 VITAMIN B12 DEFICIENCY: Primary | ICD-10-CM

## 2020-12-08 PROCEDURE — 96372 THER/PROPH/DIAG INJ SC/IM: CPT | Performed by: FAMILY MEDICINE

## 2020-12-08 RX ADMIN — CYANOCOBALAMIN 1000 MCG: 1000 INJECTION INTRAMUSCULAR; SUBCUTANEOUS at 10:12:00

## 2020-12-10 ENCOUNTER — TELEPHONE (OUTPATIENT)
Dept: FAMILY MEDICINE CLINIC | Facility: CLINIC | Age: 75
End: 2020-12-10

## 2020-12-10 DIAGNOSIS — R35.0 URINARY FREQUENCY: Primary | ICD-10-CM

## 2020-12-10 DIAGNOSIS — E78.00 HYPERCHOLESTEREMIA: ICD-10-CM

## 2020-12-10 RX ORDER — LEVOTHYROXINE SODIUM 0.05 MG/1
50 TABLET ORAL
Qty: 90 TABLET | Refills: 3 | Status: SHIPPED | OUTPATIENT
Start: 2020-12-10

## 2020-12-10 RX ORDER — LOVASTATIN 20 MG/1
TABLET ORAL
Qty: 90 TABLET | Refills: 3 | Status: SHIPPED | OUTPATIENT
Start: 2020-12-10

## 2020-12-10 RX ORDER — ERGOCALCIFEROL 1.25 MG/1
50000 CAPSULE ORAL
Qty: 12 CAPSULE | Refills: 3 | Status: SHIPPED | OUTPATIENT
Start: 2020-12-10

## 2020-12-10 NOTE — TELEPHONE ENCOUNTER
Patient's son calling to follow-up on lab result     Dr. Gucci Quinonez:   Please review lab results and advise

## 2021-01-11 ENCOUNTER — NURSE ONLY (OUTPATIENT)
Dept: FAMILY MEDICINE CLINIC | Facility: CLINIC | Age: 76
End: 2021-01-11
Payer: MEDICARE

## 2021-01-11 PROCEDURE — 96372 THER/PROPH/DIAG INJ SC/IM: CPT | Performed by: FAMILY MEDICINE

## 2021-01-11 RX ADMIN — CYANOCOBALAMIN 1000 MCG: 1000 INJECTION INTRAMUSCULAR; SUBCUTANEOUS at 13:14:00

## 2021-01-21 ENCOUNTER — TELEPHONE (OUTPATIENT)
Dept: FAMILY MEDICINE CLINIC | Facility: CLINIC | Age: 76
End: 2021-01-21

## 2021-01-21 NOTE — TELEPHONE ENCOUNTER
Left message on patients son Jack Lee to make an appointment for his mom to have an Medicare Wellness [facility].  (Last Medicare Wellness - 3/22/18)

## 2021-02-03 ENCOUNTER — TELEPHONE (OUTPATIENT)
Dept: FAMILY MEDICINE CLINIC | Facility: CLINIC | Age: 76
End: 2021-02-03

## 2021-02-03 NOTE — TELEPHONE ENCOUNTER
Verified name and  of patient. Son of patient (on SPRING) calling to request a virtual visit for patient to follow up on patient's chronic migraine headaches.     Appointment scheduled:  Future Appointments   Date Time Provider Kyleigh Hoang

## 2021-02-04 ENCOUNTER — TELEMEDICINE (OUTPATIENT)
Dept: FAMILY MEDICINE CLINIC | Facility: CLINIC | Age: 76
End: 2021-02-04

## 2021-02-04 DIAGNOSIS — R20.0 NUMBNESS AND TINGLING IN BOTH HANDS: ICD-10-CM

## 2021-02-04 DIAGNOSIS — E53.8 VITAMIN B12 DEFICIENCY: ICD-10-CM

## 2021-02-04 DIAGNOSIS — R20.2 NUMBNESS AND TINGLING IN BOTH HANDS: ICD-10-CM

## 2021-02-04 DIAGNOSIS — R42 DIZZINESS: ICD-10-CM

## 2021-02-04 DIAGNOSIS — R51.9 CHRONIC DAILY HEADACHE: Primary | ICD-10-CM

## 2021-02-04 DIAGNOSIS — R29.898 LEG HEAVINESS: ICD-10-CM

## 2021-02-04 PROCEDURE — 99214 OFFICE O/P EST MOD 30 MIN: CPT | Performed by: FAMILY MEDICINE

## 2021-02-04 NOTE — PROGRESS NOTES
This visit is conducted using Telemedicine with live, interactive video and audio during this Coronavirus pandemic. Please note that the following visit was completed using two-way, real-time interactive audio and/or video communication.   This has been last year she missed multiple B12 injections. She has resumed them since December 2020        ROS  A comprehensive 10 point review of systems was completed. Pertinent positives and negatives noted in the the HPI.       PATIENTS DENIES ANY KNOWN EXPOSURE T MOUTH EVERY NIGHT AT BEDTIME AS NEEDED FOR SLEEP, Disp: 30 tablet, Rfl: 0  •  acetaminophen (TYLENOL) 325 MG Oral Tab, Take 2 tablets by mouth every 8 (eight) hours as needed. , Disp: , Rfl:   •  Multiple Vitamin (MULTI-VITAMINS) Oral Tab, Take 1 tablet by Future    5. Vitamin B12 deficiency  Continue monthly b12 injections  Follow up 3 months    Labs all reviewed from 12/3/2020 and discussed again with patient and son, Dayana Herrera.         Patient reminded to practice good health and safety measures including was

## 2021-02-11 ENCOUNTER — NURSE ONLY (OUTPATIENT)
Dept: FAMILY MEDICINE CLINIC | Facility: CLINIC | Age: 76
End: 2021-02-11
Payer: MEDICARE

## 2021-02-11 DIAGNOSIS — E53.8 VITAMIN B12 DEFICIENCY: Primary | ICD-10-CM

## 2021-02-11 PROCEDURE — 96372 THER/PROPH/DIAG INJ SC/IM: CPT | Performed by: FAMILY MEDICINE

## 2021-02-11 RX ADMIN — CYANOCOBALAMIN 1000 MCG: 1000 INJECTION INTRAMUSCULAR; SUBCUTANEOUS at 11:09:00

## 2021-02-11 NOTE — PROGRESS NOTES
Pt was in for b12 inj, pt stated name and , pt stated she tolerated inj well, inj was administered in L deltoid, pt tolerated well and had no other questions.

## 2021-02-17 ENCOUNTER — HOSPITAL ENCOUNTER (OUTPATIENT)
Dept: MRI IMAGING | Age: 76
Discharge: HOME | End: 2021-02-17
Attending: FAMILY MEDICINE
Payer: MEDICARE

## 2021-02-17 DIAGNOSIS — R42 DIZZINESS: ICD-10-CM

## 2021-02-17 DIAGNOSIS — R51.9 CHRONIC DAILY HEADACHE: ICD-10-CM

## 2021-02-17 DIAGNOSIS — R20.0 NUMBNESS AND TINGLING IN BOTH HANDS: ICD-10-CM

## 2021-02-17 DIAGNOSIS — R29.898 LEG HEAVINESS: ICD-10-CM

## 2021-02-17 DIAGNOSIS — R20.2 NUMBNESS AND TINGLING IN BOTH HANDS: ICD-10-CM

## 2021-02-17 PROCEDURE — 70551 MRI BRAIN STEM W/O DYE: CPT | Performed by: FAMILY MEDICINE

## 2021-02-17 PROCEDURE — 72141 MRI NECK SPINE W/O DYE: CPT | Performed by: FAMILY MEDICINE

## 2021-02-18 ENCOUNTER — TELEPHONE (OUTPATIENT)
Dept: NEUROLOGY | Facility: CLINIC | Age: 76
End: 2021-02-18

## 2021-02-18 NOTE — TELEPHONE ENCOUNTER
Spoke to patient's son and notified him of below. He was understanding. He states that they will discuss her BP with PCP first and then proceed with neurosurgery. He was very thankful for the call and information.

## 2021-02-18 NOTE — TELEPHONE ENCOUNTER
MRI BRAIN (CPT=70551): Result Notes   Mikala Cha MD   2/18/2021 12:42 PM      No acute abnormality. There are some chronic changes-age related mild atrophy and microvascular changes.    MRI SPINE CERVICAL (CPT=72141): Result Notes     Andrea Quiroz

## 2021-02-19 DIAGNOSIS — R20.2 NUMBNESS AND TINGLING IN BOTH HANDS: ICD-10-CM

## 2021-02-19 DIAGNOSIS — R20.0 NUMBNESS AND TINGLING IN BOTH HANDS: ICD-10-CM

## 2021-02-19 DIAGNOSIS — M48.02 CERVICAL STENOSIS OF SPINAL CANAL: Primary | ICD-10-CM

## 2021-02-19 DIAGNOSIS — R29.898 LEG HEAVINESS: ICD-10-CM

## 2021-03-08 RX ORDER — BETAMETHASONE DIPROPIONATE 0.5 MG/G
CREAM TOPICAL
Qty: 15 G | Refills: 1 | Status: SHIPPED | OUTPATIENT
Start: 2021-03-08

## 2021-03-08 NOTE — TELEPHONE ENCOUNTER
Per pharmacy pt is requesting refill on the following medication.       •  BETAMETHASONE DIPROPIONATE 0.05 % External Cream, APPLY EXTERNALLY TO THE AFFECTED AREA TWICE DAILY AS NEEDED, Disp: 15 g, Rfl: 1

## 2021-04-03 ENCOUNTER — TELEMEDICINE (OUTPATIENT)
Dept: FAMILY MEDICINE CLINIC | Facility: CLINIC | Age: 76
End: 2021-04-03

## 2021-04-03 DIAGNOSIS — M48.02 CERVICAL STENOSIS OF SPINAL CANAL: Primary | ICD-10-CM

## 2021-04-03 DIAGNOSIS — R20.2 NUMBNESS AND TINGLING IN BOTH HANDS: ICD-10-CM

## 2021-04-03 DIAGNOSIS — R29.898 LEG HEAVINESS: ICD-10-CM

## 2021-04-03 DIAGNOSIS — R51.9 CHRONIC DAILY HEADACHE: ICD-10-CM

## 2021-04-03 DIAGNOSIS — R20.0 NUMBNESS AND TINGLING IN BOTH HANDS: ICD-10-CM

## 2021-04-03 DIAGNOSIS — R21 RASH OF BOTH FEET: ICD-10-CM

## 2021-04-03 PROCEDURE — 99214 OFFICE O/P EST MOD 30 MIN: CPT | Performed by: FAMILY MEDICINE

## 2021-04-03 RX ORDER — CLOTRIMAZOLE AND BETAMETHASONE DIPROPIONATE 10; .64 MG/G; MG/G
1 CREAM TOPICAL 2 TIMES DAILY PRN
Qty: 45 G | Refills: 3 | Status: SHIPPED | OUTPATIENT
Start: 2021-04-03 | End: 2021-05-04

## 2021-04-03 NOTE — PROGRESS NOTES
This visit is conducted using Telemedicine with live, interactive video and audio during this Coronavirus pandemic. Please note that the following visit was completed using two-way, real-time interactive audio and/or video communication.   This has been bilateral neural foraminal stenosis. 4. C5-C6:  Mild central canal stenosis and left greater than right neural foraminal stenosis.    5. C6-C7:  Mild central canal stenosis and right neural foraminal stenosis.         She has seen neuro and was referred t APPLY EXTERNALLY TO THE AFFECTED AREA TWICE DAILY AS NEEDED, Disp: 15 g, Rfl: 1  •  Lovastatin 20 MG Oral Tab, TAKE 1 TABLET BY MOUTH EVERY EVENING, Disp: 90 tablet, Rfl: 3  •  Levothyroxine Sodium 50 MCG Oral Tab, Take 1 tablet (50 mcg total) by mouth bef encounter diagnosis)  Numbness and tingling in both hands  Chronic daily headache  Leg heaviness  Rash of both feet    1. Cervical stenosis of spinal canal  Reviewed MRI report as reviewed by neurology and patient was recommended to see surgery.   Referral

## 2021-04-07 ENCOUNTER — NURSE ONLY (OUTPATIENT)
Dept: FAMILY MEDICINE CLINIC | Facility: CLINIC | Age: 76
End: 2021-04-07
Payer: MEDICARE

## 2021-04-07 DIAGNOSIS — E53.8 VITAMIN B12 DEFICIENCY: Primary | ICD-10-CM

## 2021-04-07 PROCEDURE — 96372 THER/PROPH/DIAG INJ SC/IM: CPT | Performed by: FAMILY MEDICINE

## 2021-04-07 RX ADMIN — CYANOCOBALAMIN 1000 MCG: 1000 INJECTION INTRAMUSCULAR; SUBCUTANEOUS at 13:30:00

## 2021-04-07 NOTE — PROGRESS NOTES
Pt here today for b12 injection, last admin on 2021. Name and  verified, injection given on left deltoid and tolerated well. Advised patient to schedule next nurse visit for next dose due in 30 days, verbalized understanding.

## 2021-05-04 DIAGNOSIS — R21 RASH OF BOTH FEET: ICD-10-CM

## 2021-05-04 RX ORDER — BETAMETHASONE DIPROPIONATE 0.5 MG/G
CREAM TOPICAL
Qty: 15 G | Refills: 1 | OUTPATIENT
Start: 2021-05-04

## 2021-05-04 RX ORDER — CLOTRIMAZOLE AND BETAMETHASONE DIPROPIONATE 10; .64 MG/G; MG/G
1 CREAM TOPICAL 2 TIMES DAILY PRN
Qty: 45 G | Refills: 0 | Status: SHIPPED | OUTPATIENT
Start: 2021-05-04 | End: 2021-06-09

## 2021-05-15 ENCOUNTER — NURSE ONLY (OUTPATIENT)
Dept: FAMILY MEDICINE CLINIC | Facility: CLINIC | Age: 76
End: 2021-05-15
Payer: MEDICARE

## 2021-05-15 PROCEDURE — 96372 THER/PROPH/DIAG INJ SC/IM: CPT | Performed by: FAMILY MEDICINE

## 2021-05-15 RX ADMIN — CYANOCOBALAMIN 1000 MCG: 1000 INJECTION INTRAMUSCULAR; SUBCUTANEOUS at 10:27:00

## 2021-05-26 DIAGNOSIS — F32.A ANXIETY AND DEPRESSION: ICD-10-CM

## 2021-05-26 DIAGNOSIS — F41.9 ANXIETY AND DEPRESSION: ICD-10-CM

## 2021-06-02 ENCOUNTER — TELEPHONE (OUTPATIENT)
Dept: FAMILY MEDICINE CLINIC | Facility: CLINIC | Age: 76
End: 2021-06-02

## 2021-06-02 NOTE — TELEPHONE ENCOUNTER
Her b12 levels remain lower end despite injections though she has missed doses.   Goal b12 level above 400

## 2021-06-02 NOTE — TELEPHONE ENCOUNTER
Verified name and . Emergency contact of patient (on SPRING) calling to ask if appropriate for patient to take Vitamin B12 supplements orally instead of being administered the Vitamin B12 shot every month. Please advise and thank you.

## 2021-06-02 NOTE — TELEPHONE ENCOUNTER
Patient had very low B12 and was seen by GI and was recommend B12 injections likely due to poor absorption

## 2021-06-09 DIAGNOSIS — R21 RASH OF BOTH FEET: ICD-10-CM

## 2021-06-09 RX ORDER — CLOTRIMAZOLE AND BETAMETHASONE DIPROPIONATE 10; .64 MG/G; MG/G
CREAM TOPICAL
Qty: 45 G | Refills: 0 | Status: SHIPPED | OUTPATIENT
Start: 2021-06-09

## 2021-06-21 ENCOUNTER — NURSE ONLY (OUTPATIENT)
Dept: FAMILY MEDICINE CLINIC | Facility: CLINIC | Age: 76
End: 2021-06-21
Payer: MEDICARE

## 2021-06-21 PROCEDURE — 96372 THER/PROPH/DIAG INJ SC/IM: CPT | Performed by: FAMILY MEDICINE

## 2021-06-21 RX ADMIN — CYANOCOBALAMIN 1000 MCG: 1000 INJECTION INTRAMUSCULAR; SUBCUTANEOUS at 13:13:00

## 2021-06-21 NOTE — PROGRESS NOTES
Patient came in today for Vitamin B-12 vaccine administration. Patient tolerated well to vaccine. Full name and  verified.

## 2021-07-21 ENCOUNTER — NURSE ONLY (OUTPATIENT)
Dept: FAMILY MEDICINE CLINIC | Facility: CLINIC | Age: 76
End: 2021-07-21
Payer: MEDICARE

## 2021-07-21 PROCEDURE — 96372 THER/PROPH/DIAG INJ SC/IM: CPT | Performed by: FAMILY MEDICINE

## 2021-07-21 RX ADMIN — CYANOCOBALAMIN 1000 MCG: 1000 INJECTION INTRAMUSCULAR; SUBCUTANEOUS at 13:14:00

## 2021-08-26 ENCOUNTER — TELEMEDICINE (OUTPATIENT)
Dept: FAMILY MEDICINE CLINIC | Facility: CLINIC | Age: 76
End: 2021-08-26
Payer: MEDICARE

## 2021-08-26 DIAGNOSIS — R35.0 URINARY FREQUENCY: ICD-10-CM

## 2021-08-26 DIAGNOSIS — R35.0 URINARY FREQUENCY: Primary | ICD-10-CM

## 2021-08-26 PROCEDURE — 99441 PHONE E/M BY PHYS 5-10 MIN: CPT | Performed by: FAMILY MEDICINE

## 2021-08-26 RX ORDER — OXYBUTYNIN CHLORIDE 5 MG/1
5 TABLET, EXTENDED RELEASE ORAL DAILY
Qty: 30 TABLET | Refills: 0 | Status: SHIPPED | OUTPATIENT
Start: 2021-08-26 | End: 2021-08-26

## 2021-08-26 RX ORDER — OXYBUTYNIN CHLORIDE 5 MG/1
TABLET, EXTENDED RELEASE ORAL
Qty: 90 TABLET | Refills: 0 | Status: SHIPPED | OUTPATIENT
Start: 2021-08-26 | End: 2021-12-06

## 2021-08-26 NOTE — PROGRESS NOTES
Rabia Grubbs  or legal guardian ,verbally consents to a Virtual/Telephone Check-In visit on 05/26/20. Patient has been referred to the [facility] website at [email or web address] to review the yearly Consent to Treat document.     Patient understands and daily headache     Cervical stenosis of spinal canal          MEDICATION LIST    Current Outpatient Medications:   •  Oxybutynin Chloride ER 5 MG Oral Tablet 24 Hr, Take 1 tablet (5 mg total) by mouth daily. , Disp: 30 tablet, Rfl: 0  •  CLOTRIMAZOLE-BETAME sound short of breath. ASSESSMENT/PLAN:   Urinary frequency  (primary encounter diagnosis)    1. Urinary frequency  Trial of oxybutynin  Reviewed side effects including low blood pressure. Recommend pushing fluids and watching blood pressure.   If any

## 2021-08-26 NOTE — PROGRESS NOTES
This visit is conducted using Telemedicine with live, interactive video and audio during this Coronavirus pandemic. Please note that the following visit was completed using two-way, real-time interactive audio and/or video communication.   This has been Numbness and tingling in both hands     Leg heaviness     Dizziness     Chronic daily headache     Cervical stenosis of spinal canal      Past Medical History:   Diagnosis Date   • Arthritis    • Dry eye syndrome of lacrimal gland, bilateral 7/11/2018   • video visit due to the coronavirus emergency      General Appearance:  alert, well developed, in no acute distress  Not in acute distress, comfortably seated in own residence  Patient was speaking in complete sentences, no increased work of breathing and v

## 2021-08-30 ENCOUNTER — NURSE ONLY (OUTPATIENT)
Dept: FAMILY MEDICINE CLINIC | Facility: CLINIC | Age: 76
End: 2021-08-30
Payer: MEDICARE

## 2021-08-30 VITALS — BODY MASS INDEX: 22 KG/M2 | HEIGHT: 61 IN

## 2021-08-30 DIAGNOSIS — E53.8 VITAMIN B12 DEFICIENCY: Primary | ICD-10-CM

## 2021-08-30 PROCEDURE — 96372 THER/PROPH/DIAG INJ SC/IM: CPT | Performed by: FAMILY MEDICINE

## 2021-08-30 RX ADMIN — CYANOCOBALAMIN 1000 MCG: 1000 INJECTION INTRAMUSCULAR; SUBCUTANEOUS at 14:14:00

## 2021-09-29 ENCOUNTER — NURSE ONLY (OUTPATIENT)
Dept: FAMILY MEDICINE CLINIC | Facility: CLINIC | Age: 76
End: 2021-09-29
Payer: MEDICARE

## 2021-09-29 PROCEDURE — 96372 THER/PROPH/DIAG INJ SC/IM: CPT | Performed by: FAMILY MEDICINE

## 2021-09-29 RX ADMIN — CYANOCOBALAMIN 1000 MCG: 1000 INJECTION INTRAMUSCULAR; SUBCUTANEOUS at 14:11:00

## 2021-09-29 NOTE — PROGRESS NOTES
Patient came in for Vitamin B-12 administration, patient tolerated well to vaccine, full name and  verified.

## 2021-10-28 ENCOUNTER — NURSE ONLY (OUTPATIENT)
Dept: FAMILY MEDICINE CLINIC | Facility: CLINIC | Age: 76
End: 2021-10-28
Payer: MEDICARE

## 2021-10-28 DIAGNOSIS — Z23 NEED FOR VACCINATION: Primary | ICD-10-CM

## 2021-10-28 PROCEDURE — 96372 THER/PROPH/DIAG INJ SC/IM: CPT | Performed by: FAMILY MEDICINE

## 2021-10-28 RX ADMIN — CYANOCOBALAMIN 1000 MCG: 1000 INJECTION INTRAMUSCULAR; SUBCUTANEOUS at 14:19:00

## 2021-11-09 ENCOUNTER — IMMUNIZATION (OUTPATIENT)
Dept: LAB | Facility: HOSPITAL | Age: 76
End: 2021-11-09
Attending: EMERGENCY MEDICINE
Payer: MEDICARE

## 2021-11-09 DIAGNOSIS — Z23 NEED FOR VACCINATION: Primary | ICD-10-CM

## 2021-11-09 PROCEDURE — 0004A SARSCOV2 VAC 30MCG/0.3ML IM: CPT

## 2021-12-06 DIAGNOSIS — R35.0 URINARY FREQUENCY: ICD-10-CM

## 2021-12-06 RX ORDER — OXYBUTYNIN CHLORIDE 5 MG/1
5 TABLET, EXTENDED RELEASE ORAL DAILY
Qty: 90 TABLET | Refills: 0 | Status: SHIPPED | OUTPATIENT
Start: 2021-12-06 | End: 2021-12-21

## 2021-12-06 NOTE — TELEPHONE ENCOUNTER
Please review. Protocol Failed / No Protocol.     Requested Prescriptions   Pending Prescriptions Disp Refills    OXYBUTYNIN CHLORIDE ER 5 MG Oral Tablet 24 Hr [Pharmacy Med Name: OXYBUTYNIN ER 5MG TABLETS] 90 tablet 0     Sig: TAKE 1 TABLET(5 MG) BY MOUTH DAILY        Genitourinary Medications Failed - 12/6/2021  3:00 PM        Failed - Appointment in the past 12 or next 3 months        Passed - Patient does not have pulmonary hypertension on problem list              Recent Outpatient Visits              1 month ago Need for vaccination    [address] Family Medicine    Nurse Only    2 months ago     [address] [facility]    Nurse Only    3 months ago Vitamin B12 deficiency    [address] [facility]    Nurse Only    3 months ago Urinary frequency    Damaris Graham MD    Telemedicine    4 months ago     [address] [facility]    Nurse Only            Future Appointments         Provider Department Appt Notes    In 1 week [facility]  [address] [facility] B12 shot    In 1 month [facility]  [address] [facility] B12 shot    In 3 months [facility]  [address] [facility] B12 shot    In 4 months [facility] RN [address] [facility] B12 shot

## 2021-12-21 ENCOUNTER — OFFICE VISIT (OUTPATIENT)
Dept: FAMILY MEDICINE CLINIC | Facility: CLINIC | Age: 76
End: 2021-12-21
Payer: MEDICARE

## 2021-12-21 VITALS
DIASTOLIC BLOOD PRESSURE: 73 MMHG | WEIGHT: 103 LBS | HEIGHT: 61 IN | BODY MASS INDEX: 19.45 KG/M2 | TEMPERATURE: 96 F | SYSTOLIC BLOOD PRESSURE: 112 MMHG | HEART RATE: 76 BPM

## 2021-12-21 DIAGNOSIS — E53.8 VITAMIN B12 DEFICIENCY: ICD-10-CM

## 2021-12-21 DIAGNOSIS — E55.9 VITAMIN D DEFICIENCY: ICD-10-CM

## 2021-12-21 DIAGNOSIS — Z00.00 ENCOUNTER FOR ANNUAL HEALTH EXAMINATION: Primary | ICD-10-CM

## 2021-12-21 DIAGNOSIS — Z23 FLU VACCINE NEED: ICD-10-CM

## 2021-12-21 DIAGNOSIS — R73.03 PREDIABETES: ICD-10-CM

## 2021-12-21 DIAGNOSIS — E78.00 HYPERCHOLESTEREMIA: ICD-10-CM

## 2021-12-21 DIAGNOSIS — Z11.59 NEED FOR HEPATITIS C SCREENING TEST: ICD-10-CM

## 2021-12-21 DIAGNOSIS — F32.A ANXIETY AND DEPRESSION: ICD-10-CM

## 2021-12-21 DIAGNOSIS — F41.9 ANXIETY AND DEPRESSION: ICD-10-CM

## 2021-12-21 DIAGNOSIS — Z78.0 POSTMENOPAUSAL: ICD-10-CM

## 2021-12-21 DIAGNOSIS — R51.9 CHRONIC DAILY HEADACHE: ICD-10-CM

## 2021-12-21 DIAGNOSIS — Z23 NEED FOR VACCINATION: ICD-10-CM

## 2021-12-21 DIAGNOSIS — R35.0 URINARY FREQUENCY: ICD-10-CM

## 2021-12-21 DIAGNOSIS — E03.9 HYPOTHYROIDISM, UNSPECIFIED TYPE: ICD-10-CM

## 2021-12-21 PROCEDURE — 90662 IIV NO PRSV INCREASED AG IM: CPT | Performed by: FAMILY MEDICINE

## 2021-12-21 PROCEDURE — G0439 PPPS, SUBSEQ VISIT: HCPCS | Performed by: FAMILY MEDICINE

## 2021-12-21 PROCEDURE — 90732 PPSV23 VACC 2 YRS+ SUBQ/IM: CPT | Performed by: FAMILY MEDICINE

## 2021-12-21 PROCEDURE — G0008 ADMIN INFLUENZA VIRUS VAC: HCPCS | Performed by: FAMILY MEDICINE

## 2021-12-21 PROCEDURE — 96372 THER/PROPH/DIAG INJ SC/IM: CPT | Performed by: FAMILY MEDICINE

## 2021-12-21 PROCEDURE — G0009 ADMIN PNEUMOCOCCAL VACCINE: HCPCS | Performed by: FAMILY MEDICINE

## 2021-12-21 RX ORDER — OXYBUTYNIN CHLORIDE 5 MG/1
5 TABLET, EXTENDED RELEASE ORAL DAILY
Qty: 90 TABLET | Refills: 3 | Status: SHIPPED | OUTPATIENT
Start: 2021-12-21

## 2021-12-21 RX ADMIN — CYANOCOBALAMIN 1000 MCG: 1000 INJECTION INTRAMUSCULAR; SUBCUTANEOUS at 15:59:00

## 2021-12-21 NOTE — PATIENT INSTRUCTIONS
Anum Deluca's SCREENING SCHEDULE   Tests on this list are recommended by your physician but may not be covered, or covered at this frequency, by your insurer. Please check with your insurance carrier before scheduling to verify coverage.    PREVENT visit.      Guillermo Mendoza Never done   Pap and Pelvic    Pap   Covered every 2 years for women at normal risk;  Annually if at high risk -  No recommendations at this time    Chlamydia Annually if high risk -  No recommendations at this time   Screening Mammogr Directives.

## 2021-12-21 NOTE — PROGRESS NOTES
HPI:   Kimberli Fong is a 77year old female who presents for a Medicare Subsequent Annual Wellness visit (Pt already had Initial Annual Wellness).     Wt Readings from Last 6 Encounters:  12/21/21 : 103 lb (46.7 kg)  02/01/20 : 116 lb 11.2 oz (52.9 kg does NOT have a Power of  for [employer] on file in [address]    Advance care planning including the explanation and discussion of advance directives standard forms performed Face to Face with patient and Family/surrogate (if present), and forms availa sulfanilamide. CURRENT MEDICATIONS:   Oxybutynin Chloride ER 5 MG Oral Tablet 24 Hr, Take 1 tablet (5 mg total) by mouth daily. sertraline 50 MG Oral Tab, Take 1 tablet (50 mg total) by mouth daily.   CLOTRIMAZOLE-BETAMETHASONE 1-0.05 % External Cream, (46.7 kg)   BMI 19.46 kg/m²  Estimated body mass index is 19.46 kg/m² as calculated from the following:    Height as of this encounter: 5' 1\" (1.549 m). Weight as of this encounter: 103 lb (46.7 kg).     Medicare Hearing Assessment  (Required for AWV/SW adenopathy;  thyroid: not enlarged, symmetric, no tenderness/mass/nodules; no carotid bruit or JVD   Back:   Symmetric, no curvature, ROM normal, no CVA tenderness   Lungs:   Clear to auscultation bilaterally, respirations unlabored   Heart:  Regular rate PANEL; Future    Chronic daily headache    Urinary frequency  -     Oxybutynin Chloride ER 5 MG Oral Tablet 24 Hr; Take 1 tablet (5 mg total) by mouth daily.     Prediabetes  -     HEMOGLOBIN A1C; Future    Postmenopausal  -     XR DEXA BONE DENSITOMETRY (C positive mental well-being?: Visiting Family; Visiting Friends;Social Interaction     Supplementary Documentation:   Anum Deluca's SCREENING SCHEDULE   Tests on this list are recommended by your physician but may not be covered, or covered at this freq yearly for long-term glucocorticoid medication use (Steroids) No results found for this or any previous visit. DEXA Scan Never done   Pap and Pelvic    Pap   Covered every 2 years for women at normal risk;  Annually if at high risk -  No recommendation

## 2022-02-14 ENCOUNTER — NURSE ONLY (OUTPATIENT)
Dept: FAMILY MEDICINE CLINIC | Facility: CLINIC | Age: 77
End: 2022-02-14
Payer: MEDICARE

## 2022-02-14 DIAGNOSIS — E53.8 VITAMIN B12 DEFICIENCY: Primary | ICD-10-CM

## 2022-02-14 PROCEDURE — 96372 THER/PROPH/DIAG INJ SC/IM: CPT | Performed by: FAMILY MEDICINE

## 2022-02-14 RX ADMIN — CYANOCOBALAMIN 1000 MCG: 1000 INJECTION INTRAMUSCULAR; SUBCUTANEOUS at 13:10:00

## 2022-02-22 RX ORDER — LOVASTATIN 20 MG/1
TABLET ORAL
Qty: 90 TABLET | Refills: 1 | OUTPATIENT
Start: 2022-02-22

## 2022-02-22 RX ORDER — LEVOTHYROXINE SODIUM 0.05 MG/1
50 TABLET ORAL
Qty: 90 TABLET | Refills: 1 | OUTPATIENT
Start: 2022-02-22

## 2022-02-22 NOTE — TELEPHONE ENCOUNTER
Please review protocol failed/ No protocol    Requested Prescriptions   Pending Prescriptions Disp Refills    LEVOTHYROXINE 50 MCG Oral Tab [Pharmacy Med Name: LEVOTHYROXINE 0.05MG (50MCG) TAB] 90 tablet 3     Sig: TAKE 1 TABLET(50 MCG) BY MOUTH BEFORE BREAKFAST        Thyroid Medication Protocol Failed - 2/20/2022  1:22 PM        Failed - TSH in past 12 months        Passed - Last TSH value is normal     Lab Results   Component Value Date    TSH 3.170 12/03/2020    THYROIDFUNC 10.87 (H) 07/08/2016                 Passed - Appointment in past 12 or next 3 months           LOVASTATIN 20 MG Oral Tab [Pharmacy Med Name: LOVASTATIN 20MG TABLETS] 90 tablet 3     Sig: TAKE 1 TABLET BY MOUTH EVERY EVENING        Cholesterol Medication Protocol Failed - 2/20/2022  1:22 PM        Failed - ALT in past 12 months        Failed - LDL in past 12 months        Failed - Last ALT < 80       Lab Results   Component Value Date    ALT 17 12/03/2020             Failed - Last LDL < 130     Lab Results   Component Value Date     (H) 12/03/2020               Passed - Appointment in past 12 or next 3 months              Recent Outpatient Visits              1 week ago Vitamin B12 deficiency    [address] [facility] [facility]    Nurse Only    2 months ago Encounter for annual health examination    Emanuel Cisneros MD    Office Visit    3 months ago Need for vaccination    [address] [facility] Family Medicine    Nurse Only    4 months ago     [address] [facility]    Nurse Only    5 months ago Vitamin B12 deficiency    [address] [facility] [facility]    Nurse Only            Future Appointments         Provider Department Appt Notes    In 3 weeks [facility] RN [address] [facility] B12 shot    In 1 month [facility]LR RN [address] Family Medicine B12 shot

## 2022-02-22 NOTE — TELEPHONE ENCOUNTER
Last blood work in December 2020. Patient very overdue for blood work.   Please have patient get blood work done

## 2022-02-23 NOTE — TELEPHONE ENCOUNTER
Spoke David Landers (son)  SPRING verified informed him of message and instruction will have labs drawn.     No further questions

## 2022-03-08 ENCOUNTER — LAB ENCOUNTER (OUTPATIENT)
Dept: LAB | Age: 77
End: 2022-03-08
Attending: FAMILY MEDICINE
Payer: MEDICARE

## 2022-03-08 DIAGNOSIS — E53.8 VITAMIN B12 DEFICIENCY: ICD-10-CM

## 2022-03-08 DIAGNOSIS — E55.9 VITAMIN D DEFICIENCY: ICD-10-CM

## 2022-03-08 DIAGNOSIS — E03.9 HYPOTHYROIDISM, UNSPECIFIED TYPE: ICD-10-CM

## 2022-03-08 DIAGNOSIS — E78.00 HYPERCHOLESTEREMIA: ICD-10-CM

## 2022-03-08 DIAGNOSIS — Z11.59 NEED FOR HEPATITIS C SCREENING TEST: ICD-10-CM

## 2022-03-08 DIAGNOSIS — R73.03 PREDIABETES: ICD-10-CM

## 2022-03-08 LAB
ALBUMIN SERPL-MCNC: 3.7 G/DL (ref 3.4–5)
ALBUMIN/GLOB SERPL: 1 {RATIO} (ref 1–2)
ALP LIVER SERPL-CCNC: 116 U/L
ALT SERPL-CCNC: 18 U/L
ANION GAP SERPL CALC-SCNC: 4 MMOL/L (ref 0–18)
AST SERPL-CCNC: 21 U/L (ref 15–37)
BILIRUB SERPL-MCNC: 0.5 MG/DL (ref 0.1–2)
BILIRUB UR QL: NEGATIVE
BUN BLD-MCNC: 17 MG/DL (ref 7–18)
BUN/CREAT SERPL: 15.9 (ref 10–20)
CALCIUM BLD-MCNC: 9.5 MG/DL (ref 8.5–10.1)
CHLORIDE SERPL-SCNC: 111 MMOL/L (ref 98–112)
CHOLEST SERPL-MCNC: 191 MG/DL (ref ?–200)
CO2 SERPL-SCNC: 25 MMOL/L (ref 21–32)
COLOR UR: YELLOW
CREAT BLD-MCNC: 1.07 MG/DL
EST. AVERAGE GLUCOSE BLD GHB EST-MCNC: 126 MG/DL (ref 68–126)
FASTING STATUS PATIENT QL REPORTED: YES
GLOBULIN PLAS-MCNC: 3.8 G/DL (ref 2.8–4.4)
GLUCOSE BLD-MCNC: 99 MG/DL (ref 70–99)
GLUCOSE UR-MCNC: NEGATIVE MG/DL
HBA1C MFR BLD: 6 % (ref ?–5.7)
HCV AB SERPL QL IA: NONREACTIVE
HDLC SERPL-MCNC: 51 MG/DL (ref 40–59)
KETONES UR-MCNC: NEGATIVE MG/DL
LDLC SERPL CALC-MCNC: 113 MG/DL (ref ?–100)
NITRITE UR QL STRIP.AUTO: NEGATIVE
NONHDLC SERPL-MCNC: 140 MG/DL (ref ?–130)
OSMOLALITY SERPL CALC.SUM OF ELEC: 292 MOSM/KG (ref 275–295)
PH UR: 6 [PH] (ref 5–8)
POTASSIUM SERPL-SCNC: 5.3 MMOL/L (ref 3.5–5.1)
PROT SERPL-MCNC: 7.5 G/DL (ref 6.4–8.2)
PROT UR-MCNC: NEGATIVE MG/DL
SODIUM SERPL-SCNC: 140 MMOL/L (ref 136–145)
SP GR UR STRIP: 1.02 (ref 1–1.03)
T4 FREE SERPL-MCNC: 1 NG/DL (ref 0.8–1.7)
TRIGL SERPL-MCNC: 156 MG/DL (ref 30–149)
TSI SER-ACNC: 2.7 MIU/ML (ref 0.36–3.74)
UROBILINOGEN UR STRIP-ACNC: <2
VIT B12 SERPL-MCNC: 436 PG/ML (ref 193–986)
VIT C UR-MCNC: NEGATIVE MG/DL
VIT D+METAB SERPL-MCNC: 12.3 NG/ML (ref 30–100)
VLDLC SERPL CALC-MCNC: 27 MG/DL (ref 0–30)
WBC #/AREA URNS AUTO: >50 /HPF

## 2022-03-08 PROCEDURE — 87077 CULTURE AEROBIC IDENTIFY: CPT | Performed by: FAMILY MEDICINE

## 2022-03-08 PROCEDURE — 87186 SC STD MICRODIL/AGAR DIL: CPT | Performed by: FAMILY MEDICINE

## 2022-03-08 PROCEDURE — 82607 VITAMIN B-12: CPT

## 2022-03-08 PROCEDURE — 87086 URINE CULTURE/COLONY COUNT: CPT | Performed by: FAMILY MEDICINE

## 2022-03-08 PROCEDURE — 81001 URINALYSIS AUTO W/SCOPE: CPT | Performed by: FAMILY MEDICINE

## 2022-03-08 PROCEDURE — 82306 VITAMIN D 25 HYDROXY: CPT

## 2022-03-08 PROCEDURE — 84439 ASSAY OF FREE THYROXINE: CPT

## 2022-03-08 PROCEDURE — 84443 ASSAY THYROID STIM HORMONE: CPT

## 2022-03-08 PROCEDURE — 36415 COLL VENOUS BLD VENIPUNCTURE: CPT

## 2022-03-08 PROCEDURE — 80061 LIPID PANEL: CPT

## 2022-03-08 PROCEDURE — 83036 HEMOGLOBIN GLYCOSYLATED A1C: CPT

## 2022-03-08 PROCEDURE — 86803 HEPATITIS C AB TEST: CPT

## 2022-03-08 PROCEDURE — 80053 COMPREHEN METABOLIC PANEL: CPT

## 2022-03-10 ENCOUNTER — TELEPHONE (OUTPATIENT)
Dept: FAMILY MEDICINE CLINIC | Facility: CLINIC | Age: 77
End: 2022-03-10

## 2022-03-10 RX ORDER — ERGOCALCIFEROL 1.25 MG/1
50000 CAPSULE ORAL
Qty: 12 CAPSULE | Refills: 3 | Status: SHIPPED | OUTPATIENT
Start: 2022-03-10

## 2022-03-10 RX ORDER — LEVOTHYROXINE SODIUM 0.05 MG/1
50 TABLET ORAL
Qty: 90 TABLET | Refills: 3 | Status: SHIPPED | OUTPATIENT
Start: 2022-03-10

## 2022-03-10 RX ORDER — BETAMETHASONE DIPROPIONATE 0.5 MG/G
CREAM TOPICAL
Qty: 15 G | Refills: 1 | Status: SHIPPED | OUTPATIENT
Start: 2022-03-10

## 2022-03-10 RX ORDER — CIPROFLOXACIN 250 MG/1
250 TABLET, FILM COATED ORAL 2 TIMES DAILY
Qty: 6 TABLET | Refills: 0 | Status: SHIPPED | OUTPATIENT
Start: 2022-03-10 | End: 2022-03-13

## 2022-03-10 RX ORDER — LOVASTATIN 20 MG/1
TABLET ORAL
Qty: 90 TABLET | Refills: 3 | Status: SHIPPED | OUTPATIENT
Start: 2022-03-10

## 2022-03-10 NOTE — TELEPHONE ENCOUNTER
Called and spoke to son and review test results. All medications the same. Kidney function slightly decreased. Recommend monitoring kidney function and to repeat in the next 3 months. Increase water intake. History of recurrent urinary frequency. Recommend seeing urogynecology. Avoid NSAIDs.

## 2022-03-31 ENCOUNTER — NURSE ONLY (OUTPATIENT)
Dept: FAMILY MEDICINE CLINIC | Facility: CLINIC | Age: 77
End: 2022-03-31
Payer: MEDICARE

## 2022-03-31 DIAGNOSIS — E53.8 VITAMIN B12 DEFICIENCY: Primary | ICD-10-CM

## 2022-03-31 PROCEDURE — 96372 THER/PROPH/DIAG INJ SC/IM: CPT | Performed by: FAMILY MEDICINE

## 2022-03-31 RX ADMIN — CYANOCOBALAMIN 1000 MCG: 1000 INJECTION INTRAMUSCULAR; SUBCUTANEOUS at 14:10:00

## 2022-05-05 ENCOUNTER — NURSE ONLY (OUTPATIENT)
Dept: FAMILY MEDICINE CLINIC | Facility: CLINIC | Age: 77
End: 2022-05-05
Payer: MEDICARE

## 2022-05-05 DIAGNOSIS — E53.8 VITAMIN B12 DEFICIENCY: Primary | ICD-10-CM

## 2022-05-05 PROCEDURE — 96372 THER/PROPH/DIAG INJ SC/IM: CPT | Performed by: FAMILY MEDICINE

## 2022-05-05 RX ADMIN — CYANOCOBALAMIN 1000 MCG: 1000 INJECTION INTRAMUSCULAR; SUBCUTANEOUS at 11:09:00

## 2022-05-31 ENCOUNTER — TELEPHONE (OUTPATIENT)
Dept: FAMILY MEDICINE CLINIC | Facility: CLINIC | Age: 77
End: 2022-05-31

## 2022-05-31 NOTE — TELEPHONE ENCOUNTER
Spoke to [facility], patient's son,  (on SPRING, verified patient's name and ). Patient previously saw Dr. Honey Wheat for lightheadedness at night and had some scans done. This appointment is a follow up to that.      Future Appointments   Date Time Provider Kyleigh Hoang   2022  3:00 PM Claudean Stable, MD [facility]

## 2022-06-14 ENCOUNTER — TELEMEDICINE (OUTPATIENT)
Dept: FAMILY MEDICINE CLINIC | Facility: CLINIC | Age: 77
End: 2022-06-14
Payer: MEDICARE

## 2022-06-14 DIAGNOSIS — R53.1 WEAKNESS: ICD-10-CM

## 2022-06-14 DIAGNOSIS — R42 DIZZINESS: ICD-10-CM

## 2022-06-14 DIAGNOSIS — E55.9 VITAMIN D DEFICIENCY: ICD-10-CM

## 2022-06-14 DIAGNOSIS — R51.9 FRONTAL HEADACHE: ICD-10-CM

## 2022-06-14 DIAGNOSIS — R35.0 URINARY FREQUENCY: Primary | ICD-10-CM

## 2022-06-14 DIAGNOSIS — R35.1 NOCTURIA: ICD-10-CM

## 2022-06-14 DIAGNOSIS — E03.9 HYPOTHYROIDISM, UNSPECIFIED TYPE: ICD-10-CM

## 2022-06-14 DIAGNOSIS — R41.3 MEMORY CHANGES: ICD-10-CM

## 2022-06-14 DIAGNOSIS — R20.2 BILATERAL LEG PARESTHESIA: ICD-10-CM

## 2022-06-14 DIAGNOSIS — R10.30 LOWER ABDOMINAL PAIN: ICD-10-CM

## 2022-06-14 DIAGNOSIS — E53.8 VITAMIN B12 DEFICIENCY: ICD-10-CM

## 2022-06-14 DIAGNOSIS — M79.89 LEG SWELLING: ICD-10-CM

## 2022-06-28 ENCOUNTER — TELEPHONE (OUTPATIENT)
Dept: FAMILY MEDICINE CLINIC | Facility: CLINIC | Age: 77
End: 2022-06-28

## 2022-06-28 NOTE — TELEPHONE ENCOUNTER
Spoke to Gilberto, patient's son,  (on SPRING, verified patient's name and ). He asked to schedule a nurse visit for a B12 injection. Future Appointments   Date Time Provider Kyleigh Hoang   2022  2:00 PM [facility] RN [facility] [place]     He asked if her labs were in the system and asked for the scheduling information for Rodrigo Chawla in Urogynecology. He also asked if she had any other test orders. Relayed there is an outstanding bone density test from 2021. Gave him scheduling phone number. No other questions/concerns at this time.

## 2022-07-02 ENCOUNTER — NURSE ONLY (OUTPATIENT)
Dept: FAMILY MEDICINE CLINIC | Facility: CLINIC | Age: 77
End: 2022-07-02
Payer: MEDICARE

## 2022-07-02 ENCOUNTER — LAB ENCOUNTER (OUTPATIENT)
Dept: LAB | Age: 77
End: 2022-07-02
Attending: FAMILY MEDICINE
Payer: MEDICARE

## 2022-07-02 DIAGNOSIS — E53.8 VITAMIN B12 DEFICIENCY: Primary | ICD-10-CM

## 2022-07-02 DIAGNOSIS — R53.1 WEAKNESS: ICD-10-CM

## 2022-07-02 DIAGNOSIS — E55.9 VITAMIN D DEFICIENCY: ICD-10-CM

## 2022-07-02 DIAGNOSIS — E03.9 HYPOTHYROIDISM, UNSPECIFIED TYPE: ICD-10-CM

## 2022-07-02 DIAGNOSIS — E53.8 VITAMIN B12 DEFICIENCY: ICD-10-CM

## 2022-07-02 DIAGNOSIS — R10.30 LOWER ABDOMINAL PAIN: ICD-10-CM

## 2022-07-02 LAB
ALBUMIN SERPL-MCNC: 3.6 G/DL (ref 3.4–5)
ALBUMIN/GLOB SERPL: 0.9 {RATIO} (ref 1–2)
ALP LIVER SERPL-CCNC: 92 U/L
ALT SERPL-CCNC: 13 U/L
ANION GAP SERPL CALC-SCNC: 8 MMOL/L (ref 0–18)
AST SERPL-CCNC: 21 U/L (ref 15–37)
BASOPHILS # BLD AUTO: 0.04 X10(3) UL (ref 0–0.2)
BASOPHILS NFR BLD AUTO: 0.8 %
BILIRUB SERPL-MCNC: 0.5 MG/DL (ref 0.1–2)
BUN BLD-MCNC: 13 MG/DL (ref 7–18)
BUN/CREAT SERPL: 13 (ref 10–20)
CALCIUM BLD-MCNC: 9.2 MG/DL (ref 8.5–10.1)
CHLORIDE SERPL-SCNC: 112 MMOL/L (ref 98–112)
CO2 SERPL-SCNC: 22 MMOL/L (ref 21–32)
CREAT BLD-MCNC: 1 MG/DL
DEPRECATED RDW RBC AUTO: 47.6 FL (ref 35.1–46.3)
EOSINOPHIL # BLD AUTO: 0.15 X10(3) UL (ref 0–0.7)
EOSINOPHIL NFR BLD AUTO: 3.1 %
ERYTHROCYTE [DISTWIDTH] IN BLOOD BY AUTOMATED COUNT: 13 % (ref 11–15)
FASTING STATUS PATIENT QL REPORTED: NO
GLOBULIN PLAS-MCNC: 3.8 G/DL (ref 2.8–4.4)
GLUCOSE BLD-MCNC: 95 MG/DL (ref 70–99)
HCT VFR BLD AUTO: 38.7 %
HGB BLD-MCNC: 12.4 G/DL
IMM GRANULOCYTES # BLD AUTO: 0.01 X10(3) UL (ref 0–1)
IMM GRANULOCYTES NFR BLD: 0.2 %
LYMPHOCYTES # BLD AUTO: 1.33 X10(3) UL (ref 1–4)
LYMPHOCYTES NFR BLD AUTO: 27.8 %
MCH RBC QN AUTO: 32 PG (ref 26–34)
MCHC RBC AUTO-ENTMCNC: 32 G/DL (ref 31–37)
MCV RBC AUTO: 99.7 FL
MONOCYTES # BLD AUTO: 0.37 X10(3) UL (ref 0.1–1)
MONOCYTES NFR BLD AUTO: 7.7 %
NEUTROPHILS # BLD AUTO: 2.89 X10 (3) UL (ref 1.5–7.7)
NEUTROPHILS # BLD AUTO: 2.89 X10(3) UL (ref 1.5–7.7)
NEUTROPHILS NFR BLD AUTO: 60.4 %
OSMOLALITY SERPL CALC.SUM OF ELEC: 294 MOSM/KG (ref 275–295)
PLATELET # BLD AUTO: 251 10(3)UL (ref 150–450)
POTASSIUM SERPL-SCNC: 4.2 MMOL/L (ref 3.5–5.1)
PROT SERPL-MCNC: 7.4 G/DL (ref 6.4–8.2)
RBC # BLD AUTO: 3.88 X10(6)UL
SODIUM SERPL-SCNC: 142 MMOL/L (ref 136–145)
TSI SER-ACNC: 1.07 MIU/ML (ref 0.36–3.74)
VIT B12 SERPL-MCNC: >2000 PG/ML (ref 193–986)
VIT D+METAB SERPL-MCNC: 99.5 NG/ML (ref 30–100)
WBC # BLD AUTO: 4.8 X10(3) UL (ref 4–11)

## 2022-07-02 PROCEDURE — 84443 ASSAY THYROID STIM HORMONE: CPT

## 2022-07-02 PROCEDURE — 82306 VITAMIN D 25 HYDROXY: CPT

## 2022-07-02 PROCEDURE — 85025 COMPLETE CBC W/AUTO DIFF WBC: CPT

## 2022-07-02 PROCEDURE — 36415 COLL VENOUS BLD VENIPUNCTURE: CPT

## 2022-07-02 PROCEDURE — 82607 VITAMIN B-12: CPT

## 2022-07-02 PROCEDURE — 80053 COMPREHEN METABOLIC PANEL: CPT

## 2022-07-02 RX ADMIN — CYANOCOBALAMIN 1000 MCG: 1000 INJECTION INTRAMUSCULAR; SUBCUTANEOUS at 09:50:00

## 2022-07-06 DIAGNOSIS — E55.9 VITAMIN D DEFICIENCY: Primary | ICD-10-CM

## 2022-08-06 ENCOUNTER — NURSE ONLY (OUTPATIENT)
Dept: FAMILY MEDICINE CLINIC | Facility: CLINIC | Age: 77
End: 2022-08-06
Payer: MEDICARE

## 2022-08-06 DIAGNOSIS — E53.8 VITAMIN B12 DEFICIENCY: Primary | ICD-10-CM

## 2022-08-06 PROCEDURE — 96372 THER/PROPH/DIAG INJ SC/IM: CPT | Performed by: FAMILY MEDICINE

## 2022-08-06 RX ADMIN — CYANOCOBALAMIN 1000 MCG: 1000 INJECTION INTRAMUSCULAR; SUBCUTANEOUS at 10:37:00

## 2022-09-10 ENCOUNTER — NURSE ONLY (OUTPATIENT)
Dept: FAMILY MEDICINE CLINIC | Facility: CLINIC | Age: 77
End: 2022-09-10
Payer: MEDICARE

## 2022-09-10 DIAGNOSIS — E53.8 VITAMIN B12 DEFICIENCY: Primary | ICD-10-CM

## 2022-09-10 PROCEDURE — 96372 THER/PROPH/DIAG INJ SC/IM: CPT | Performed by: FAMILY MEDICINE

## 2022-09-10 RX ADMIN — CYANOCOBALAMIN 1000 MCG: 1000 INJECTION INTRAMUSCULAR; SUBCUTANEOUS at 11:48:00

## 2022-09-11 RX ORDER — BETAMETHASONE DIPROPIONATE 0.5 MG/G
CREAM TOPICAL
Qty: 15 G | Refills: 1 | Status: SHIPPED | OUTPATIENT
Start: 2022-09-11

## 2022-09-13 ENCOUNTER — TELEPHONE (OUTPATIENT)
Dept: FAMILY MEDICINE CLINIC | Facility: CLINIC | Age: 77
End: 2022-09-13

## 2022-09-13 DIAGNOSIS — R35.0 URINARY FREQUENCY: ICD-10-CM

## 2022-09-13 DIAGNOSIS — R35.1 NOCTURIA: Primary | ICD-10-CM

## 2022-09-13 NOTE — TELEPHONE ENCOUNTER
Spoke to Chilo Rebollar, pt's son. Advised of referral and told to bring insurance card to appointment.  Pt son verbalized understanding

## 2022-09-13 NOTE — TELEPHONE ENCOUNTER
Can try Dr Conrad Castaneda, make sure to bring correct / updated insurance paperwork always to a new provider

## 2022-09-13 NOTE — TELEPHONE ENCOUNTER
Son, states that the patient had an appointment with Dr. Cedeño/Urology today. Per the son they were at the appointment and could not be seen due to not having the actual insurance card.  Son, would like to know if the doctor can refer the patient to another [facility].

## 2022-09-14 ENCOUNTER — TELEPHONE (OUTPATIENT)
Dept: FAMILY MEDICINE CLINIC | Facility: CLINIC | Age: 77
End: 2022-09-14

## 2022-09-14 DIAGNOSIS — R35.1 NOCTURIA: Primary | ICD-10-CM

## 2022-09-14 DIAGNOSIS — R35.0 URINARY FREQUENCY: ICD-10-CM

## 2022-09-14 NOTE — TELEPHONE ENCOUNTER
Topic: Referral Question. Dr. Lia De La Cruz location is far for [facility]. Can she be referred to any of the below. Thanks!   MD Karen Blevins,

## 2022-09-26 ENCOUNTER — TELEPHONE (OUTPATIENT)
Dept: FAMILY MEDICINE CLINIC | Facility: CLINIC | Age: 77
End: 2022-09-26

## 2022-09-26 DIAGNOSIS — R35.0 URINARY FREQUENCY: ICD-10-CM

## 2022-09-26 DIAGNOSIS — R35.1 NOCTURIA: Primary | ICD-10-CM

## 2022-10-12 ENCOUNTER — NURSE ONLY (OUTPATIENT)
Dept: FAMILY MEDICINE CLINIC | Facility: CLINIC | Age: 77
End: 2022-10-12
Payer: MEDICARE

## 2022-10-12 DIAGNOSIS — E53.8 VITAMIN B12 DEFICIENCY: Primary | ICD-10-CM

## 2022-10-12 PROCEDURE — 96372 THER/PROPH/DIAG INJ SC/IM: CPT | Performed by: FAMILY MEDICINE

## 2022-10-12 RX ADMIN — CYANOCOBALAMIN 1000 MCG: 1000 INJECTION INTRAMUSCULAR; SUBCUTANEOUS at 15:53:00

## 2022-10-17 LAB — AMB EXT COVID-19 RESULT: DETECTED

## 2022-10-18 ENCOUNTER — NURSE TRIAGE (OUTPATIENT)
Dept: FAMILY MEDICINE CLINIC | Facility: CLINIC | Age: 77
End: 2022-10-18

## 2022-10-19 ENCOUNTER — TELEPHONE (OUTPATIENT)
Dept: FAMILY MEDICINE CLINIC | Facility: CLINIC | Age: 77
End: 2022-10-19

## 2022-10-19 NOTE — TELEPHONE ENCOUNTER
Spoke with pt's son, MIKE verified  He stated pt was tested positive for covid 2 days ago. Pt has a mild sx, small cough that started Saturday, otherwise she is ok. He stated due to pt age,  she needs to have the pills (paxlovid). He was informed paxlovid should be given within the first 5 days of sx onset. Pt's son is aware  No available appt left for today, if he wants he can take pt to IC for eval and treat. He  stated understanding. See telephone encounter 10-18-22.       BENITA

## 2022-11-21 ENCOUNTER — NURSE ONLY (OUTPATIENT)
Dept: FAMILY MEDICINE CLINIC | Facility: CLINIC | Age: 77
End: 2022-11-21
Payer: MEDICARE

## 2022-11-21 DIAGNOSIS — E53.8 VITAMIN B12 DEFICIENCY: Primary | ICD-10-CM

## 2022-11-21 PROCEDURE — 96372 THER/PROPH/DIAG INJ SC/IM: CPT | Performed by: FAMILY MEDICINE

## 2022-11-21 RX ADMIN — CYANOCOBALAMIN 1000 MCG: 1000 INJECTION, SOLUTION INTRAMUSCULAR; SUBCUTANEOUS at 15:15:00

## 2023-02-27 DIAGNOSIS — R35.0 URINARY FREQUENCY: ICD-10-CM

## 2023-02-27 RX ORDER — OXYBUTYNIN CHLORIDE 5 MG/1
TABLET, EXTENDED RELEASE ORAL
Qty: 90 TABLET | Refills: 1 | Status: SHIPPED | OUTPATIENT
Start: 2023-02-27

## 2023-02-27 NOTE — TELEPHONE ENCOUNTER
Refill passed per [facility] protocol. CSS, please assist patient with scheduling appointment for annual physical.    Dr. Franko Crowley, please advise last office visit 12/21/2021. Thank you. Per telehealth progress note by Dr. Franko Crowley 06/14/2022:  1. Urinary frequency  - OP REFERRAL TO UROGYNECOLOGY CLINIC  2.  Nocturia  - OP REFERRAL TO UROGYNECOLOGY CLINIC    Requested Prescriptions   Pending Prescriptions Disp Refills    OXYBUTYNIN ER 5 MG Oral Tablet 24 Hr [Pharmacy Med Name: OXYBUTYNIN ER 5MG TABLETS] 90 tablet 3     Sig: TAKE 1 TABLET(5 MG) BY MOUTH DAILY       Genitourinary Medications Passed - 2/27/2023  3:38 AM        Passed - Patient does not have pulmonary hypertension on problem list        Passed - In person appointment or virtual visit in the past 12 mos or appointment in next 3 mos     Recent Outpatient Visits              3 months ago Vitamin B12 deficiency    [address], [place]    Nurse Only    4 months ago Vitamin B12 deficiency    [address], [place]    Nurse Only    5 months ago Vitamin B12 deficiency    [address], [place]    Nurse Only    6 months ago Vitamin B12 deficiency    [address], [place]    Nurse Only    8 months ago Vitamin B12 deficiency    [address], [place]    Nurse Only                           Recent Outpatient Visits              3 months ago Vitamin B12 deficiency    [address], [place]    Nurse Only    4 months ago Vitamin B12 deficiency    [address], [place]    Nurse Only    5 months ago Vitamin B12 deficiency    [address], [place]    Nurse Only    6 months ago Vitamin B12 deficiency    [address], [place]    Nurse Only    8 months ago Vitamin B12 deficiency    [address], [address], [place]    Nurse Only

## 2023-03-16 ENCOUNTER — NURSE ONLY (OUTPATIENT)
Dept: FAMILY MEDICINE CLINIC | Facility: CLINIC | Age: 78
End: 2023-03-16

## 2023-03-16 DIAGNOSIS — E53.8 VITAMIN B12 DEFICIENCY: Primary | ICD-10-CM

## 2023-03-16 PROCEDURE — 96372 THER/PROPH/DIAG INJ SC/IM: CPT | Performed by: FAMILY MEDICINE

## 2023-03-16 RX ADMIN — CYANOCOBALAMIN 1000 MCG: 1000 INJECTION, SOLUTION INTRAMUSCULAR; SUBCUTANEOUS at 13:15:00

## 2023-04-26 ENCOUNTER — NURSE ONLY (OUTPATIENT)
Dept: FAMILY MEDICINE CLINIC | Facility: CLINIC | Age: 78
End: 2023-04-26

## 2023-04-26 DIAGNOSIS — E53.8 VITAMIN B12 DEFICIENCY: Primary | ICD-10-CM

## 2023-04-26 PROCEDURE — 96372 THER/PROPH/DIAG INJ SC/IM: CPT | Performed by: FAMILY MEDICINE

## 2023-04-26 RX ADMIN — CYANOCOBALAMIN 1000 MCG: 1000 INJECTION, SOLUTION INTRAMUSCULAR; SUBCUTANEOUS at 15:59:00

## 2023-06-12 ENCOUNTER — TELEPHONE (OUTPATIENT)
Dept: FAMILY MEDICINE CLINIC | Facility: CLINIC | Age: 78
End: 2023-06-12

## 2023-06-12 DIAGNOSIS — F41.9 ANXIETY AND DEPRESSION: ICD-10-CM

## 2023-06-12 DIAGNOSIS — F32.A ANXIETY AND DEPRESSION: ICD-10-CM

## 2023-06-13 NOTE — TELEPHONE ENCOUNTER
Talked to [address] [place] [address] ([facility]) told him message below understood and he will make an appointment for his mom thru her [address]

## 2023-06-13 NOTE — TELEPHONE ENCOUNTER
Please review. Protocol failed / Has no protocol. Kindful message sent to patient to schedule an office visit with PCP. Will also make a phone attempt.     Requested Prescriptions   Pending Prescriptions Disp Refills    SERTRALINE 50 MG Oral Tab [Pharmacy Med Name: SERTRALINE 50MG TABLETS] 90 tablet 3     Sig: TAKE 1 TABLET(50 MG) BY MOUTH DAILY       Psychiatric Non-Scheduled (Anti-Anxiety) Failed - 6/12/2023  3:10 PM        Failed - In person appointment or virtual visit in the past 6 mos or appointment in next 3 mos     Recent Outpatient Visits              1 month ago Vitamin B12 deficiency    [facility] [address], [address], [place]    Nurse Only    2 months ago Vitamin B12 deficiency    [facility], [address], [place]    Nurse Only    6 months ago Vitamin B12 deficiency    [address], [address], [place]    Nurse Only    8 months ago Vitamin B12 deficiency    [address], [address], [place]    Nurse Only    9 months ago Vitamin B12 deficiency    [address], [address], [place]    Nurse Only                            Recent Outpatient Visits              1 month ago Vitamin B12 deficiency    [address], [address], [place]    Nurse Only    2 months ago Vitamin B12 deficiency    [address], [address], [place]    Nurse Only    6 months ago Vitamin B12 deficiency    [address], [address], [place]    Nurse Only    8 months ago Vitamin B12 deficiency    [address], [address], [place]    Nurse Only    9 months ago Vitamin B12 deficiency    [facility] [address], [address], [place]    Nurse Only

## 2023-07-24 ENCOUNTER — NURSE ONLY (OUTPATIENT)
Dept: FAMILY MEDICINE CLINIC | Facility: CLINIC | Age: 78
End: 2023-07-24

## 2023-07-24 DIAGNOSIS — E53.8 VITAMIN B12 DEFICIENCY: Primary | ICD-10-CM

## 2023-07-24 RX ADMIN — CYANOCOBALAMIN 1000 MCG: 1000 INJECTION, SOLUTION INTRAMUSCULAR; SUBCUTANEOUS at 14:12:00

## 2023-08-22 ENCOUNTER — OFFICE VISIT (OUTPATIENT)
Dept: FAMILY MEDICINE CLINIC | Facility: CLINIC | Age: 78
End: 2023-08-22

## 2023-08-22 ENCOUNTER — LAB ENCOUNTER (OUTPATIENT)
Dept: LAB | Age: 78
End: 2023-08-22
Attending: FAMILY MEDICINE
Payer: MEDICARE

## 2023-08-22 VITALS
OXYGEN SATURATION: 96 % | SYSTOLIC BLOOD PRESSURE: 110 MMHG | WEIGHT: 99 LBS | HEART RATE: 73 BPM | DIASTOLIC BLOOD PRESSURE: 80 MMHG | BODY MASS INDEX: 18.69 KG/M2 | HEIGHT: 61 IN

## 2023-08-22 DIAGNOSIS — F32.A ANXIETY AND DEPRESSION: ICD-10-CM

## 2023-08-22 DIAGNOSIS — R73.03 PREDIABETES: ICD-10-CM

## 2023-08-22 DIAGNOSIS — E53.8 VITAMIN B12 DEFICIENCY: ICD-10-CM

## 2023-08-22 DIAGNOSIS — Z23 NEED FOR VACCINATION: ICD-10-CM

## 2023-08-22 DIAGNOSIS — R51.9 CHRONIC DAILY HEADACHE: ICD-10-CM

## 2023-08-22 DIAGNOSIS — N32.81 OVERACTIVE BLADDER: ICD-10-CM

## 2023-08-22 DIAGNOSIS — E78.00 HYPERCHOLESTEREMIA: ICD-10-CM

## 2023-08-22 DIAGNOSIS — E03.9 HYPOTHYROIDISM, UNSPECIFIED TYPE: ICD-10-CM

## 2023-08-22 DIAGNOSIS — Z78.0 POSTMENOPAUSAL: ICD-10-CM

## 2023-08-22 DIAGNOSIS — Z00.00 ENCOUNTER FOR ANNUAL HEALTH EXAMINATION: Primary | ICD-10-CM

## 2023-08-22 DIAGNOSIS — F41.9 ANXIETY AND DEPRESSION: ICD-10-CM

## 2023-08-22 DIAGNOSIS — E55.9 VITAMIN D DEFICIENCY: ICD-10-CM

## 2023-08-22 DIAGNOSIS — M40.00 POSTURAL KYPHOSIS, UNSPECIFIED SPINAL REGION: ICD-10-CM

## 2023-08-22 LAB
ALBUMIN SERPL-MCNC: 3.5 G/DL (ref 3.4–5)
ALBUMIN/GLOB SERPL: 1 {RATIO} (ref 1–2)
ALP LIVER SERPL-CCNC: 102 U/L
ALT SERPL-CCNC: 12 U/L
ANION GAP SERPL CALC-SCNC: 8 MMOL/L (ref 0–18)
AST SERPL-CCNC: 18 U/L (ref 15–37)
BASOPHILS # BLD AUTO: 0.06 X10(3) UL (ref 0–0.2)
BASOPHILS NFR BLD AUTO: 1.2 %
BILIRUB SERPL-MCNC: 0.3 MG/DL (ref 0.1–2)
BUN BLD-MCNC: 17 MG/DL (ref 7–18)
BUN/CREAT SERPL: 14.8 (ref 10–20)
CALCIUM BLD-MCNC: 9 MG/DL (ref 8.5–10.1)
CHLORIDE SERPL-SCNC: 111 MMOL/L (ref 98–112)
CHOLEST SERPL-MCNC: 173 MG/DL (ref ?–200)
CO2 SERPL-SCNC: 23 MMOL/L (ref 21–32)
CREAT BLD-MCNC: 1.15 MG/DL
DEPRECATED RDW RBC AUTO: 43.8 FL (ref 35.1–46.3)
EGFRCR SERPLBLD CKD-EPI 2021: 49 ML/MIN/1.73M2 (ref 60–?)
EOSINOPHIL # BLD AUTO: 0.18 X10(3) UL (ref 0–0.7)
EOSINOPHIL NFR BLD AUTO: 3.5 %
ERYTHROCYTE [DISTWIDTH] IN BLOOD BY AUTOMATED COUNT: 12.5 % (ref 11–15)
EST. AVERAGE GLUCOSE BLD GHB EST-MCNC: 128 MG/DL (ref 68–126)
FASTING PATIENT LIPID ANSWER: NO
FASTING STATUS PATIENT QL REPORTED: NO
GLOBULIN PLAS-MCNC: 3.6 G/DL (ref 2.8–4.4)
GLUCOSE BLD-MCNC: 117 MG/DL (ref 70–99)
HBA1C MFR BLD: 6.1 % (ref ?–5.7)
HCT VFR BLD AUTO: 35.9 %
HDLC SERPL-MCNC: 49 MG/DL (ref 40–59)
HGB BLD-MCNC: 11.9 G/DL
IMM GRANULOCYTES # BLD AUTO: 0.02 X10(3) UL (ref 0–1)
IMM GRANULOCYTES NFR BLD: 0.4 %
LDLC SERPL CALC-MCNC: 93 MG/DL (ref ?–100)
LYMPHOCYTES # BLD AUTO: 1.59 X10(3) UL (ref 1–4)
LYMPHOCYTES NFR BLD AUTO: 30.6 %
MCH RBC QN AUTO: 31.7 PG (ref 26–34)
MCHC RBC AUTO-ENTMCNC: 33.1 G/DL (ref 31–37)
MCV RBC AUTO: 95.7 FL
MONOCYTES # BLD AUTO: 0.45 X10(3) UL (ref 0.1–1)
MONOCYTES NFR BLD AUTO: 8.7 %
NEUTROPHILS # BLD AUTO: 2.89 X10 (3) UL (ref 1.5–7.7)
NEUTROPHILS # BLD AUTO: 2.89 X10(3) UL (ref 1.5–7.7)
NEUTROPHILS NFR BLD AUTO: 55.6 %
NONHDLC SERPL-MCNC: 124 MG/DL (ref ?–130)
OSMOLALITY SERPL CALC.SUM OF ELEC: 297 MOSM/KG (ref 275–295)
PLATELET # BLD AUTO: 233 10(3)UL (ref 150–450)
POTASSIUM SERPL-SCNC: 3.5 MMOL/L (ref 3.5–5.1)
PROT SERPL-MCNC: 7.1 G/DL (ref 6.4–8.2)
RBC # BLD AUTO: 3.75 X10(6)UL
SODIUM SERPL-SCNC: 142 MMOL/L (ref 136–145)
TRIGL SERPL-MCNC: 178 MG/DL (ref 30–149)
TSI SER-ACNC: 0.76 MIU/ML (ref 0.36–3.74)
VIT B12 SERPL-MCNC: >2000 PG/ML (ref 193–986)
VIT D+METAB SERPL-MCNC: 16.4 NG/ML (ref 30–100)
VLDLC SERPL CALC-MCNC: 29 MG/DL (ref 0–30)
WBC # BLD AUTO: 5.2 X10(3) UL (ref 4–11)

## 2023-08-22 PROCEDURE — 82306 VITAMIN D 25 HYDROXY: CPT

## 2023-08-22 PROCEDURE — 36415 COLL VENOUS BLD VENIPUNCTURE: CPT

## 2023-08-22 PROCEDURE — 80061 LIPID PANEL: CPT

## 2023-08-22 PROCEDURE — 84443 ASSAY THYROID STIM HORMONE: CPT

## 2023-08-22 PROCEDURE — 83036 HEMOGLOBIN GLYCOSYLATED A1C: CPT

## 2023-08-22 PROCEDURE — G0439 PPPS, SUBSEQ VISIT: HCPCS | Performed by: FAMILY MEDICINE

## 2023-08-22 PROCEDURE — 80053 COMPREHEN METABOLIC PANEL: CPT

## 2023-08-22 PROCEDURE — 82607 VITAMIN B-12: CPT

## 2023-08-22 PROCEDURE — 85025 COMPLETE CBC W/AUTO DIFF WBC: CPT

## 2023-08-22 PROCEDURE — 96372 THER/PROPH/DIAG INJ SC/IM: CPT | Performed by: FAMILY MEDICINE

## 2023-08-22 RX ADMIN — CYANOCOBALAMIN 1000 MCG: 1000 INJECTION, SOLUTION INTRAMUSCULAR; SUBCUTANEOUS at 11:10:00

## 2023-08-28 DIAGNOSIS — E78.00 HYPERCHOLESTEREMIA: ICD-10-CM

## 2023-08-28 DIAGNOSIS — E03.9 HYPOTHYROIDISM, UNSPECIFIED TYPE: Primary | ICD-10-CM

## 2023-08-28 DIAGNOSIS — N28.9 DECREASED RENAL FUNCTION: ICD-10-CM

## 2023-08-28 DIAGNOSIS — E55.9 VITAMIN D DEFICIENCY: ICD-10-CM

## 2023-08-28 RX ORDER — LEVOTHYROXINE SODIUM 0.05 MG/1
50 TABLET ORAL
Qty: 90 TABLET | Refills: 3 | Status: SHIPPED | OUTPATIENT
Start: 2023-08-28

## 2023-08-28 RX ORDER — LOVASTATIN 20 MG/1
TABLET ORAL
Qty: 90 TABLET | Refills: 3 | Status: SHIPPED | OUTPATIENT
Start: 2023-08-28

## 2023-08-28 RX ORDER — ERGOCALCIFEROL 1.25 MG/1
50000 CAPSULE ORAL
Qty: 12 CAPSULE | Refills: 3 | Status: SHIPPED | OUTPATIENT
Start: 2023-08-28

## 2023-08-31 ENCOUNTER — PATIENT MESSAGE (OUTPATIENT)
Dept: FAMILY MEDICINE CLINIC | Facility: CLINIC | Age: 78
End: 2023-08-31

## 2023-09-05 ENCOUNTER — PATIENT OUTREACH (OUTPATIENT)
Dept: FAMILY MEDICINE CLINIC | Facility: CLINIC | Age: 78
End: 2023-09-05

## 2023-09-06 DIAGNOSIS — F41.9 ANXIETY AND DEPRESSION: ICD-10-CM

## 2023-09-06 DIAGNOSIS — E78.00 HYPERCHOLESTEREMIA: ICD-10-CM

## 2023-09-06 DIAGNOSIS — F32.A ANXIETY AND DEPRESSION: ICD-10-CM

## 2023-09-06 DIAGNOSIS — E03.9 HYPOTHYROIDISM, UNSPECIFIED TYPE: ICD-10-CM

## 2023-09-07 RX ORDER — LEVOTHYROXINE SODIUM 0.05 MG/1
50 TABLET ORAL
Qty: 90 TABLET | Refills: 3 | OUTPATIENT
Start: 2023-09-07

## 2023-09-07 RX ORDER — LOVASTATIN 20 MG/1
TABLET ORAL
Qty: 90 TABLET | Refills: 3 | OUTPATIENT
Start: 2023-09-07

## 2023-09-07 NOTE — TELEPHONE ENCOUNTER
Ref[place] per [place] [facility] protocol.   Requested Prescriptions   Pending Prescriptions Disp Refills    LEVOTHYROXINE 50 MCG Oral Tab [Pharmacy Med Name: LEVOTHYROXINE 0.05MG (50MCG) TAB] 90 tablet 3     Sig: TAKE 1 TABLET(50 MCG) BY MOUTH BEFORE BREAKFAST       Thyroid Medication Protocol Passed - 9/6/2023  5:40 AM        Passed - TSH in past 12 months        Passed - Last TSH value is normal     Lab Results   Component Value Date    TSH 0.763 08/22/2023    THYROIDFUNC 10.87 (H) 07/08/2016                 Passed - In person appointment or virtual visit in the past 12 mos or appointment in next 3 mos     Recent Outpatient Visits              2 weeks ago Encounter for annual health examination    Nancy Edmonds MD    Office Visit    1 month ago Vitamin B12 deficiency    [facility], [place]    Nurse Only    4 months ago Vitamin B12 deficiency    [facility], [place]    Nurse Only    5 months ago Vitamin B12 deficiency    [address] [facility] [facility], [place]    Nurse Only    9 months ago Vitamin B12 deficiency    [facility] [address], [facility], [place]    Nurse Only                        LOVASTATIN 20 MG Oral Tab [Pharmacy Med Name: LOVASTATIN 20MG TABLETS] 90 tablet 3     Sig: TAKE 1 [facility]       Cholesterol Medication Protocol Passed - 9/6/2023  5:40 AM        Passed - ALT in past 12 months        Passed - LDL in past 12 months        Passed - Last ALT < 80     Lab Results   Component Value Date    ALT 12 (L) 08/22/2023             Passed - Last LDL < 130     Lab Results   Component Value Date    LDL 93 08/22/2023             Passed - In person appointment or virtual visit in the past 12 mos or appointment in next 3 mos     Recent Outpatient Visits              2 weeks ago Encounter for annual health examination    [facility] Pardeep Nelson MD    Office Visit    1 month ago Vitamin B12 deficiency    [facility], [address], [place]    Nurse Only    4 months ago Vitamin B12 deficiency    [place] [address], [address], [place]    Nurse Only    5 months ago Vitamin B12 deficiency    [facility], [address], [place]    Nurse Only    9 months ago Vitamin B12 deficiency    [facility] [facility], [address], [place]    Nurse Only                        SERTRALINE 50 MG Oral Tab [Pharmacy Med Name: SERTRALINE 50MG TABLETS] 90 tablet 0     Sig: TAKE 1 TABLET(50 MG) BY MOUTH DAILY       Psychiatric Non-Scheduled (Anti-Anxiety) Passed - 9/6/2023  5:40 AM        Passed - In person appointment or virtual visit in the past 6 mos or appointment in next 3 mos     Recent Outpatient Visits              2 weeks ago Encounter for annual health examination    David Siddiqi MD    Office Visit    1 month ago Vitamin B12 deficiency    [facility], [address], [place]    Nurse Only    4 months ago Vitamin B12 deficiency    [facility] [facility], [address], [place]    Nurse Only    5 months ago Vitamin B12 deficiency    [facility], [address], [place]    Nurse Only    9 months ago Vitamin B12 deficiency    [facility] [facility], [address], [place]    Nurse Only                         Recent Outpatient Visits              2 weeks ago Encounter for annual health examination    David Siddiqi MD    Office Visit    1 month ago Vitamin B12 deficiency    [facility] [facility], [address], [place]    Nurse Only    4 months ago Vitamin B12 deficiency    [facility], [address], [place]    Nurse Only    5 months ago Vitamin B12 deficiency    [place] [facility], [place]    Nurse Only    9 months ago Vitamin B12 deficiency    [address], [address], [place]    Nurse Only

## 2023-09-13 RX ORDER — BETAMETHASONE DIPROPIONATE 0.5 MG/G
CREAM TOPICAL
Qty: 15 G | Refills: 1 | Status: SHIPPED | OUTPATIENT
Start: 2023-09-13

## 2023-09-18 ENCOUNTER — TELEPHONE (OUTPATIENT)
Dept: FAMILY MEDICINE CLINIC | Facility: CLINIC | Age: 78
End: 2023-09-18

## 2023-09-18 DIAGNOSIS — M79.10 MYALGIA: Primary | ICD-10-CM

## 2023-09-18 DIAGNOSIS — M25.50 POLYARTHRALGIA: ICD-10-CM

## 2023-09-18 NOTE — TELEPHONE ENCOUNTER
Patient's son Krishna Alberto calling - on SPRING (patient name and  verified)  states that patient was seen on . States when patient sleeps on her side, she wakes up with rib pain and numbness from shoulder to her feet on the side she is laying on. The symptoms last all day. Son states this was mentioned at the visit and is asking if there is any testing or treatment plan for these symptoms. Please advise.

## 2023-09-19 NOTE — TELEPHONE ENCOUNTER
Patient has always had multiple musculoskeletal problems. Please have her complete bone density.   Would have her see rheumatology for ongoing muscular/joint pain  Please inform I can refer her to Dr Sameera Rogers

## 2023-09-25 ENCOUNTER — PATIENT MESSAGE (OUTPATIENT)
Dept: FAMILY MEDICINE CLINIC | Facility: CLINIC | Age: 78
End: 2023-09-25

## 2023-09-26 NOTE — TELEPHONE ENCOUNTER
From: [facility]  To: Lola Coe  Sent: 9/25/2023 11:17 AM CDT  Subject: B12 Shot    Hi, can Anum's B12 Shot appointment be accommodated with me Pam Olivier) on Wednesday, 27th at noon (currently its scheduled for 1 PM). Thanks!

## 2023-09-27 ENCOUNTER — NURSE ONLY (OUTPATIENT)
Dept: FAMILY MEDICINE CLINIC | Facility: CLINIC | Age: 78
End: 2023-09-27

## 2023-09-27 DIAGNOSIS — Z23 NEED FOR VACCINATION: Primary | ICD-10-CM

## 2023-09-27 PROCEDURE — 96372 THER/PROPH/DIAG INJ SC/IM: CPT | Performed by: FAMILY MEDICINE

## 2023-09-27 RX ADMIN — CYANOCOBALAMIN 1000 MCG: 1000 INJECTION, SOLUTION INTRAMUSCULAR; SUBCUTANEOUS at 12:51:00

## 2023-10-10 ENCOUNTER — NURSE TRIAGE (OUTPATIENT)
Dept: FAMILY MEDICINE CLINIC | Facility: CLINIC | Age: 78
End: 2023-10-10

## 2023-10-10 DIAGNOSIS — R68.84 JAW PAIN: Primary | ICD-10-CM

## 2023-10-10 NOTE — TELEPHONE ENCOUNTER
Action Requested: Summary for Provider     []  Critical Lab, Recommendations Needed  [] Need Additional Advice  []   FYI    []   Need Orders  [] Need Medications Sent to Pharmacy  []  Other     SUMMARY: Per protocol should be examined. Reason for call: Numbness    Patient's son on SPRING calling, confirmed confirmed patient's name and . Patient complains of symptoms for 2 months of numbness and tingling on one left jaw at night only that resolves when she wakes up. She also complains of neck pain, right arm and leg tingling, and inability to sleep on her right side. She went to the dentist who did not find cause for jaw numbness. Arthritis pain in both hands persist and patient will be seeing rheumatology for this. Son and patient deny any other neurological deficits. Patient is agreeable to be examined but will only see Dr. Mala Moore. Please advise. Please call son with provider's advice.     Reason for Disposition   Neck pain (with neurologic deficit)    Protocols used: Neurologic Deficit-A-[place]

## 2023-10-10 NOTE — TELEPHONE ENCOUNTER
Left Voicemail for son Alex Rolle  to call back our office. Office phone number provided with office telephone hours.

## 2023-10-10 NOTE — TELEPHONE ENCOUNTER
Patient has multiple musculoskeletal symptoms, suspect diffuse myofascial syndrome.   I would urge her to see rheumatology first

## 2023-10-11 ENCOUNTER — TELEPHONE (OUTPATIENT)
Dept: RHEUMATOLOGY | Facility: CLINIC | Age: 78
End: 2023-10-11

## 2023-10-11 NOTE — TELEPHONE ENCOUNTER
Son is requesting a sooner appointment. Patient stated mother is in a lot of pain and would like to come in as soon as possible.  Pain in right side from head to toe

## 2023-10-11 NOTE — TELEPHONE ENCOUNTER
Contacted by [facility] OF THE [facility]  and rescheduled.     Future Appointments   Date Time Provider Kyleigh Hoang   10/17/2023  9:00 AM Nona Manzano MD [facility]   10/19/2023 10:40 AM [facility] RM1 [facility] EM [place]

## 2023-10-17 ENCOUNTER — OFFICE VISIT (OUTPATIENT)
Dept: RHEUMATOLOGY | Facility: CLINIC | Age: 78
End: 2023-10-17
Payer: MEDICARE

## 2023-10-17 VITALS
DIASTOLIC BLOOD PRESSURE: 75 MMHG | HEART RATE: 67 BPM | BODY MASS INDEX: 18 KG/M2 | WEIGHT: 97.13 LBS | SYSTOLIC BLOOD PRESSURE: 112 MMHG

## 2023-10-17 DIAGNOSIS — M79.10 MYALGIA: Primary | ICD-10-CM

## 2023-10-17 DIAGNOSIS — R20.0 RIGHT SIDED NUMBNESS: ICD-10-CM

## 2023-10-17 PROCEDURE — 99204 OFFICE O/P NEW MOD 45 MIN: CPT | Performed by: INTERNAL MEDICINE

## 2023-10-17 RX ORDER — DULOXETIN HYDROCHLORIDE 20 MG/1
20 CAPSULE, DELAYED RELEASE ORAL DAILY
Qty: 30 CAPSULE | Refills: 0 | Status: SHIPPED | OUTPATIENT
Start: 2023-10-17

## 2023-10-17 NOTE — PROGRESS NOTES
Silke Choe is a 69year old female who presents for Patient presents with:  Pain: Right side numbness upon awakening lasting less than 60 minutes  . HPI:   CC: right sided numbness   Consult: referred by PCP Dr. Mikala Atwood     This is a 70 yo F with hx of HLD, Hypothyroid, HLD, Anxiety/Depression referred for right-sided numbness and muscle pain. Over the past year she has had right sided numbness when she wakes up in the morning. It will last for about an hour. She has it on her face, arms and legs. Reports some weakness. She has not had an EMG. Reports some joint pain in her hands. She has a lot of pain around her rib cage especially at night. It is hard for her to get out of bed due to the pain. She takes Tylenol 5 mg nightly which helps slightly. No rash or psoriasis. She was seen in 2020 for a positive GULSHAN. At that time work-up for connective tissue disease and rheumatoid arthritis were negative. Blood work:  +GULSHAN 1:80 homogenous   Neg ESR, CRP, RF, CCP    Wt Readings from Last 2 Encounters:  08/22/23 : 99 lb (44.9 kg)  12/21/21 : 103 lb (46.7 kg)    There is no height or weight on file to calculate BMI. Current Outpatient Medications   Medication Sig Dispense Refill    betamethasone dipropionate 0.05 % External Cream APPLY TOPICALLY TO THE AFFECTED AREA TWICE DAILY AS NEEDED 15 g 1    sertraline 50 MG Oral Tab Take 1 tablet (50 mg total) by mouth daily. 90 tablet 3    ergocalciferol 1.25 MG (02691 UT) Oral Cap Take 1 capsule (50,000 Units total) by mouth every 7 days.  12 capsule 3    levothyroxine 50 MCG Oral Tab Take 1 tablet (50 mcg total) by mouth before breakfast. 90 tablet 3    Lovastatin 20 MG Oral Tab TAKE 1 TABLET BY MOUTH EVERY EVENING 90 tablet 3    OXYBUTYNIN ER 5 MG Oral Tablet 24 Hr TAKE 1 TABLET(5 MG) BY MOUTH DAILY 90 tablet 1    ALPRAZOLAM 0.25 MG Oral Tab TAKE 1 TABLET BY MOUTH EVERY NIGHT AT BEDTIME AS NEEDED FOR SLEEP 30 tablet 0    acetaminophen 325 MG Oral Tab Take 2 tablets (650 mg total) by mouth every 8 (eight) hours as needed. Multiple Vitamin (MULTI-VITAMINS) Oral Tab Take 1 tablet by mouth once daily. Take with food        Past Medical History:   Diagnosis Date    Arthritis     Dry eye syndrome of lacrimal gland, bilateral 7/11/2018    Essential hypertension     Hypercholesterolemia     Pseudophakia of both eyes 7/11/2018    PVD (posterior vitreous detachment), both eyes 7/11/2018    Thyroid disease       Past Surgical History:   Procedure Laterality Date    CATARACT  OS 2/28/12   OD 1/14/15    ELECTROCARDIOGRAM, COMPLETE  02-    Scanned to Media Tab - Date of Service 02-      Family History   Problem Relation Age of Onset    Diabetes Mother     Other (Other) Father       Social History:  Social History     Socioeconomic History    Marital status:    Tobacco Use    Smoking status: Never    Smokeless tobacco: Never   Vaping Use    Vaping Use: Never used   Substance and Sexual Activity    Alcohol use: No     Alcohol/week: 0.0 standard drinks of alcohol    Drug use: No   Other Topics Concern    Caffeine Concern Yes     Comment: Tea, 2 cups daily    Exercise No   Social History Narrative    The patient does not use an assistive device. .      The patient does live in a home with stairs.            REVIEW OF SYSTEMS:   Review Of Systems:  Constitutional: No fever, no change in weight or appetitie  Derm: No rashes, no oral ulcers, no alopecia, no photosensitivity, no psoriasis  HEENT: No dry eyes, no dry mouth, no Raynaud's, no nasal ulcers, no parotid swelling, no neck pain, no jaw pain, no temple pain  Eyes: No visual changes,   CVS: No chest pain, no heart disease  RS: No SOB, no Cough, No Pleurtic pain,   GI: No nausea, no vomiiting, no abominal pain, no hx of ulcer, no gastritis, no heartburn, no dyshpagia, no BRBPR or melena  : no dysuria, no hx of miscarriages, no DVT Hx, no hx of OCP,   Neuro: + numbness or tingling, no headache, no hx of seizures,   Psych: +hx of anxiety or depression  ENDO: no hx of thyroid disease, no hx of DM  Joint/Muscluskeltal: see HPI,   All other ROS are negative. EXAM:   There were no vitals taken for this visit. GEN: AAOx3, NAD  HEENT: EOMI, PERRLA, no injection or icterus, oral mucosa moist, no oral lesions. No lymphadenopathy. No facial rash  CVS: RRR, no murmurs rubs or gallops. Equal 2+ distal pulses. LUNGS: CTAB, no increased work of breathing  ABDOMEN:  soft NT/ND, +BS, no HSM  SKIN: No rashes or skin lesions. No nail findings  MSK:  TTP in upper paraspinal region, trapezius region. No swelling or synovitis on exam  NEURO: Cranial nerves II-XII intact grossly. 5/5 strength throughout in both upper and lower extremities, sensation intact. PSYCH: normal mood    LABS:     Component      Latest Ref Rng 11/7/2019   GULSHAN Titer/Pattern      <80  80 ! Reviewed By: Pat Aguilera M.D.    SED RATE      0 - 30 mm/Hr 12    RHEUMATOID FACTOR      <15 IU/mL <10    GULSHAN SCREEN      Negative  Positive ! C-REACTIVE PROTEIN      <0.30 mg/dL    C-Citrullinated Peptide IgG AB      0.0 - 6.9 U/mL      Component      Latest Ref Rng 2/29/2020   GULSHAN Titer/Pattern      <80     Reviewed By:    SED RATE      0 - 30 mm/Hr 12    RHEUMATOID FACTOR      <15 IU/mL <10    GULSHAN SCREEN      Negative     C-REACTIVE PROTEIN      <0.30 mg/dL 0.35 (H)    C-Citrullinated Peptide IgG AB      0.0 - 6.9 U/mL 0.8         IMAGING:     MRI cervical spine 2021:  1. Mild-to-moderate multilevel spondylosis with greatest involvement of the mid cervical spine. Significant levels detailed below:   2. C4-C5:  Moderate central canal stenosis and bilateral neural foraminal stenosis. 3. C3-C4:  Mild central canal stenosis and bilateral neural foraminal stenosis. 4. C5-C6:  Mild central canal stenosis and left greater than right neural foraminal stenosis. 5. C6-C7:  Mild central canal stenosis and right neural foraminal stenosis     MRI brain 2021:  1. No acute intracranial process. 2. Stable mild generalized atrophy and mild chronic microangiopathic ischemic changes. ASSESSMENT AND PLAN:     Myofascial pain syndrome  - Has a lot of muscle pain upper spine and rib cage pain. - Work-up for connective tissue disease and inflammatory arthritis were negative in 2020. Symptoms are not consistent with this  - She will try Cymbalta 20 mg nightly. She is prescribed Zoloft but she is not taking it. Advised not to continue Zoloft. - For her rib pain she will also try over-the-counter lidocaine patches    Right-sided numbness  - The symptoms happen the morning for about 60 minutes. I did recommend for patient to see neurology. +GULSHAN  - She had a positive GULSHAN 1: 80 homogenous pattern in the past.  No symptoms consistent with connective tissue disease. Negative ESR, CRP, RF and CCP  - I explained that the GULSHAN is a non-specific test and does not necessarily indicate a diagnosis of autoimmune rheumatologic diesease. For example, autoantibodies also are detected in patients with organ-specific autoimmune diseases, such as autoimmune thyroiditis and hepatitis, certain infections such as hepatitis, and malignancies. In general, 20-30% of normal patients may have an GULSHAN titer of 1:40, 10-12% may have a titer of 1:80, and about 5% may have a titer of greater than or equal to 1:160     Thank you for allowing me to participate in this patients care.  Pt will f/u in 3-4 mos    Merlinda Fontana, MD  10/17/2023  9:05 AM

## 2023-10-17 NOTE — PATIENT INSTRUCTIONS
You were seen for muscle pain and rib pain and numbness on your right side  Try Cymbalta 20 mg at night to see if that helps with some of your muscle pain  Stop the sertraline also called Zoloft, you cannot take these together  For your numbness and going to have you see neurology.     Try getting lidocaine patches over-the-counter to help with some of the rib pain    Dr. Britta Gamboa, neurologist

## 2023-10-18 RX ORDER — BETAMETHASONE DIPROPIONATE 0.5 MG/G
CREAM TOPICAL
Qty: 15 G | Refills: 1 | Status: SHIPPED | OUTPATIENT
Start: 2023-10-18

## 2023-10-18 NOTE — TELEPHONE ENCOUNTER
Please review. Protocol failed / Has no protocol.      Requested Prescriptions   Pending Prescriptions Disp Refills    BETAMETHASONE DIPROPIONATE 0.05 % External Cream [Pharmacy Med Name: BETAMETHASONE DIP 0.05% CRM 15GM] 15 g 1     Sig: APPLY TOPICALLY TO THE AFFECTED AREA TWICE DAILY AS NEEDED       There is no refill protocol information for this order        Future Appointments         Provider Department Appt Notes    Tomorrow [facility] YI RM1; [facility] [address] [facility] Bone density test           Recent Outpatient Visits              Russell Miranda MD    Office Visit    3 weeks ago Need for vaccination    [address] [facility]    Nurse Only    1 month ago Encounter for annual health examination    Kia Sykes MD    Office Visit    2 months ago Vitamin B12 deficiency    [address], [place]    Nurse Only    5 months ago Vitamin B12 deficiency    [address], [address], [place]    Nurse Only

## 2023-11-03 NOTE — TELEPHONE ENCOUNTER
Please review; protocol failed.   Requested Prescriptions   Pending Prescriptions Disp Refills    BETAMETHASONE DIPROPIONATE 0.05 % External Cream [Pharmacy Med Name: BETAMETHASONE DIP 0.05% CRM 15GM] 15 g 1     Sig: APPLY TOPICALLY TO THE AFFECTED AREA TWICE DAILY AS NEEDED       There is no refill protocol information for this order        Recent Outpatient Visits              2 weeks ago Myalgia    [facility] Mila Stacy MD    Office Visit    1 month ago Need for vaccination    [facility] [place]    Nurse Only    2 months ago Encounter for annual health examination    [facility] Leon Coe MD    Office Visit    3 months ago Vitamin B12 deficiency    [facility], [address], [place]    Nurse Only    6 months ago Vitamin B12 deficiency    [facility], [address], [place]    Nurse Only

## 2023-11-04 RX ORDER — BETAMETHASONE DIPROPIONATE 0.5 MG/G
CREAM TOPICAL
Qty: 15 G | Refills: 1 | OUTPATIENT
Start: 2023-11-04

## 2023-11-14 ENCOUNTER — NURSE TRIAGE (OUTPATIENT)
Dept: FAMILY MEDICINE CLINIC | Facility: CLINIC | Age: 78
End: 2023-11-14

## 2023-11-14 NOTE — TELEPHONE ENCOUNTER
Son called; patient is due for b12 monthly injection; Holliemarilee Andry states patient c/o of pain in tongue. He is not clear of when symptoms started or unable to provide additonal information. Appt made; discussed red flags to watch for:  if any facial swelling or swelling in throat, or tongue. Call back. Son does not think its urgent. Offered soon appt. Patient only wants to be seen by Dr Karen Peralta. Appt made 11/16/23.    Reason for Disposition   Patient wants to be seen    Protocols used: Mouth Symptoms-A-[place]

## 2023-11-16 ENCOUNTER — OFFICE VISIT (OUTPATIENT)
Dept: FAMILY MEDICINE CLINIC | Facility: CLINIC | Age: 78
End: 2023-11-16

## 2023-11-16 VITALS
WEIGHT: 98 LBS | SYSTOLIC BLOOD PRESSURE: 102 MMHG | DIASTOLIC BLOOD PRESSURE: 65 MMHG | TEMPERATURE: 97 F | HEIGHT: 61 IN | BODY MASS INDEX: 18.5 KG/M2 | OXYGEN SATURATION: 99 % | HEART RATE: 74 BPM

## 2023-11-16 DIAGNOSIS — K08.109 TEETH MISSING: ICD-10-CM

## 2023-11-16 DIAGNOSIS — K14.6 SORENESS OF TONGUE: Primary | ICD-10-CM

## 2023-11-16 DIAGNOSIS — K14.8 TONGUE DISCOLORATION: ICD-10-CM

## 2023-11-16 DIAGNOSIS — E53.8 VITAMIN B12 DEFICIENCY: ICD-10-CM

## 2023-11-16 PROCEDURE — 90662 IIV NO PRSV INCREASED AG IM: CPT | Performed by: FAMILY MEDICINE

## 2023-11-16 PROCEDURE — 99213 OFFICE O/P EST LOW 20 MIN: CPT | Performed by: FAMILY MEDICINE

## 2023-11-16 PROCEDURE — G0008 ADMIN INFLUENZA VIRUS VAC: HCPCS | Performed by: FAMILY MEDICINE

## 2023-11-16 PROCEDURE — 96372 THER/PROPH/DIAG INJ SC/IM: CPT | Performed by: FAMILY MEDICINE

## 2023-11-16 RX ORDER — CYANOCOBALAMIN 1000 UG/ML
1000 INJECTION, SOLUTION INTRAMUSCULAR; SUBCUTANEOUS
Status: SHIPPED | OUTPATIENT
Start: 2023-11-16 | End: 2024-11-10

## 2023-11-16 RX ORDER — CHLORHEXIDINE GLUCONATE ORAL RINSE 1.2 MG/ML
SOLUTION DENTAL
COMMUNITY
Start: 2023-11-06

## 2023-11-16 RX ADMIN — CYANOCOBALAMIN 1000 MCG: 1000 INJECTION, SOLUTION INTRAMUSCULAR; SUBCUTANEOUS at 13:40:00

## 2023-12-12 ENCOUNTER — TELEMEDICINE (OUTPATIENT)
Dept: FAMILY MEDICINE CLINIC | Facility: CLINIC | Age: 78
End: 2023-12-12
Payer: MEDICARE

## 2023-12-12 DIAGNOSIS — R12 HEARTBURN: ICD-10-CM

## 2023-12-12 DIAGNOSIS — E53.8 VITAMIN B12 DEFICIENCY: ICD-10-CM

## 2023-12-12 DIAGNOSIS — R09.89 THROAT CLEARING: ICD-10-CM

## 2023-12-12 DIAGNOSIS — T17.308A CHOKING, INITIAL ENCOUNTER: Primary | ICD-10-CM

## 2023-12-12 DIAGNOSIS — R20.0 RIGHT SIDED NUMBNESS: ICD-10-CM

## 2023-12-12 PROCEDURE — 99214 OFFICE O/P EST MOD 30 MIN: CPT | Performed by: FAMILY MEDICINE

## 2023-12-12 RX ORDER — OMEPRAZOLE 20 MG/1
20 CAPSULE, DELAYED RELEASE ORAL
Qty: 30 CAPSULE | Refills: 0 | Status: SHIPPED | OUTPATIENT
Start: 2023-12-12 | End: 2023-12-13

## 2023-12-12 NOTE — PROGRESS NOTES
This visit is conducted using Telemedicine with live, interactive video and audio during this Coronavirus pandemic. Please note that the following visit was completed using two-way, real-time interactive audio and/or video communication. This has been done in good eric to provide continuity of care in the best interest of the provider-patient relationship, due to the ongoing public health crisis/national emergency and because of restrictions of visitation. There are limitations of this visit as no physical exam could be performed. Every conscious effort was taken to allow for sufficient and adequate time. This billing was spent on reviewing labs, medications, radiology tests and decision making. Appropriate medical decision-making and tests are ordered as detailed in the plan of care below    Telehealth outside of [facility] Consent   I conducted a telehealth visit with Carmen Hanna today, 12/12/23, which was completed using two-way, real-time interactive audio and video communication. This has been done in good eric to provide continuity of care in the best interest of the provider-patient relationship, due to the COVID -19 public health crisis/national emergency where restrictions of face-to-face office visits are ongoing. Every conscious effort was taken to allow for sufficient and adequate time to complete the visit. The patient was made aware of the limitations of the telehealth visit, including treatment limitations as no physical exam could be performed. The patient was advised to call 911 or to go to the ER in case there was an emergency. The patient was also advised of the potential privacy & security concerns related to the telehealth platform. The patient was made aware of where to find [facility] notice of privacy practices, telehealth consent form and other related consent forms and documents. which are located on the [facility] website.  The patient verbally agreed to telehealth consent form, related consents and the risks discussed. Lastly, the patient confirmed that they were in [place]. Included in this visit, time may have been spent reviewing labs, medications, radiology tests and decision making. Appropriate medical decision-making and tests are ordered as detailed in the plan of care above. Coding/billing information is submitted for this visit based on complexity of care and/or time spent for the visit. Duration of the service: 12 mins 56 secs       [facility] or legal guardian   verbally consents to a Virtual/Telephone/ VideoCheck-In service on 12/12/2023  Patient understands and accepts financial responsibility for any deductible, co-insurance and/or co-pays associated with this service. patient had a diff cell than provided in epic for video        HPI:    Lucien Stacy is a 69year old female. Chief Complaint   Patient presents with    Choking   Patient c/o she chokes on fluids recently more. Happens with tea, water, and also with food. Has to keep clearing her throat. Has noticed these symptoms 5-6 months    No problems swallowing. She does c/o heartburn    Takes tums for it. Feels \"tightness\" right sided body numbness. Has chronic urinary frequency  Has seen neuro in past   Last MRI brain normal 2/201  No headaches   She does admit that she is sedentary and doesn't do much work. Recent labs from 8/2023 reviewed and stable        ROS  A comprehensive 10 point review of systems was completed. Pertinent positives and negatives noted in the the HPI. PATIENTS DENIES ANY KNOWN EXPOSURE TO ANYONE CONFIRMED FOR COVID 19.       Patient Active Problem List   Diagnosis    Depressive disorder    Hypothyroidism    Disorder of bone and cartilage    Vitamin D deficiency    Bilateral chronic knee pain    Anxiety    Diverticulosis of small intestine without hemorrhage    Anxiety and depression    Vitamin B12 deficiency    Hypercholesteremia    Urinary frequency    Hair loss    Other insomnia    Dyspnea on exertion    Pseudophakia of both eyes    PVD (posterior vitreous detachment), both eyes    Dry eye syndrome of lacrimal gland, bilateral    Overactive bladder    GULSHAN positive    Numbness and tingling in both hands    Leg heaviness    Dizziness    Chronic daily headache    Cervical stenosis of spinal canal    Prediabetes    Postural kyphosis       Past Medical History:   Diagnosis Date    Arthritis     Dry eye syndrome of lacrimal gland, bilateral 7/11/2018    Essential hypertension     Hypercholesterolemia     Pseudophakia of both eyes 7/11/2018    PVD (posterior vitreous detachment), both eyes 7/11/2018    Thyroid disease            MEDICATION LIST    Current Outpatient Medications:     omeprazole 20 MG Oral Capsule Delayed Release, Take 1 capsule (20 mg total) by mouth every morning before breakfast., Disp: 30 capsule, Rfl: 0    chlorhexidine gluconate 0.12 % Mouth/Throat Solution, SWISH 15 ML BY MOUTH FOR 30 SECONDS TWICE DAILY FOR 10 DAYS, Disp: , Rfl:     betamethasone dipropionate 0.05 % External Cream, APPLY TOPICALLY TO THE AFFECTED AREA TWICE DAILY AS NEEDED, Disp: 15 g, Rfl: 1    DULoxetine 20 MG Oral Cap DR Particles, Take 1 capsule (20 mg total) by mouth daily. , Disp: 30 capsule, Rfl: 0    ergocalciferol 1.25 MG (48625 UT) Oral Cap, Take 1 capsule (50,000 Units total) by mouth every 7 days. , Disp: 12 capsule, Rfl: 3    levothyroxine 50 MCG Oral Tab, Take 1 tablet (50 mcg total) by mouth before breakfast., Disp: 90 tablet, Rfl: 3    Lovastatin 20 MG Oral Tab, TAKE 1 TABLET BY MOUTH EVERY EVENING, Disp: 90 tablet, Rfl: 3    OXYBUTYNIN ER 5 MG Oral Tablet 24 Hr, TAKE 1 TABLET(5 MG) BY MOUTH DAILY, Disp: 90 tablet, Rfl: 1    ALPRAZOLAM 0.25 MG Oral Tab, TAKE 1 TABLET BY MOUTH EVERY NIGHT AT BEDTIME AS NEEDED FOR SLEEP, Disp: 30 tablet, Rfl: 0    acetaminophen 325 MG Oral Tab, Take 2 tablets (650 mg total) by mouth every 8 (eight) hours as needed. , Disp: , Rfl:     Multiple Vitamin (MULTI-VITAMINS) Oral Tab, Take 1 tablet by mouth once daily. Take with food, Disp: , Rfl:     ALLERGY LIST  Allergies   Allergen Reactions    Penicillins HIVES and RASH    Sulfa Antibiotics RASH    Sulfanilamide HIVES       Allergies: Allergies   Allergen Reactions    Penicillins HIVES and RASH    Sulfa Antibiotics RASH    Sulfanilamide HIVES       PHYSICAL EXAM:     Vitals signs taken at home if available: There were no vitals taken for this visit. Limited examination for this telephone/ video visit due to the coronavirus emergency    PRESENT WITH HER OTHER SON   General Appearance:  alert, well developed, in no acute distress  Not in acute distress, comfortably seated in own residence  Patient was speaking in complete sentences, no increased work of breathing and very coherent and alert on the phone. Throat/Neck: normal sound to voice. Respiratory:  non-labored. no increased work of breathing. Skin:  normal moisture and skin texture, normal color of skin, no jaundice  Hematologic:  no excessive bruising  Psychiatric:  oriented to time, self, and place  Patient was responding to questions appropriately. ASSESSMENT/PLAN:     Encounter Diagnoses   Name Primary? Choking, initial encounter Yes    Throat clearing     Heartburn     Right sided numbness     Vitamin B12 deficiency        1. Choking, initial encounter  ? GERD  Check xray  May need to see GI/ ENT  Add omeprazole  If worsening follow up  Keep head end elevated  Avoid spicy/ greasy foods   - XR UGI/ESOPHAGUS DOUBLE CONTRAST (CPT=74246); Future  - ENT - INTERNAL    2. Throat clearing  As above  - XR UGI/ESOPHAGUS DOUBLE CONTRAST (CPT=74246); Future  - ENT - INTERNAL    3. Heartburn    - omeprazole 20 MG Oral Capsule Delayed Release; Take 1 capsule (20 mg total) by mouth every morning before breakfast.  Dispense: 30 capsule; Refill: 0    4. Right sided numbness  ?  Unclear etiology  No weakness  Perhaps need to see neurology again  Encourage gentle stretches to avoid atrophy and muscle tightness  Patient will wait she states and try more movements first     5. Vitamin B12 deficiency  Replace, inj monthly         Patient reminded to practice good health and safety measures including washing hands, social distancing, covering mouth when coughing/ sneezing, avoid social meetings/ gatherings in face of this Covid 19 pandemic. Patient verbalized understanding of plan and all questions answered to the best of my ability. Patient to call back if any change/ worsening of symptoms. No orders of the defined types were placed in this encounter. Meds This Visit:  Requested Prescriptions     Signed Prescriptions Disp Refills    omeprazole 20 MG Oral Capsule Delayed Release 30 capsule 0     Sig: Take 1 capsule (20 mg total) by mouth every morning before breakfast.       Imaging & Referrals:  ENT - INTERNAL  XR UGI/ESOPHAGUS DOUBLE CONTRAST (JRT=13036)        Leon Coe MD    12/12/2023  4:06 PM

## 2023-12-13 RX ORDER — OMEPRAZOLE 20 MG/1
20 CAPSULE, DELAYED RELEASE ORAL
Qty: 90 CAPSULE | Refills: 3 | Status: SHIPPED | OUTPATIENT
Start: 2023-12-13

## 2023-12-13 NOTE — TELEPHONE ENCOUNTER
Refill passed per [facility] protocol.   Requested Prescriptions   Pending Prescriptions Disp Refills    OMEPRAZOLE 20 MG Oral Capsule Delayed Release [Pharmacy Med Name: OMEPRAZOLE 20MG CAPSULES] 90 capsule 0     Sig: TAKE 1 CAPSULE(20 MG) BY MOUTH EVERY MORNING BEFORE BREAKFAST       Gastrointestional Medication Protocol Passed - 12/12/2023  6:21 PM        Passed - In person appointment or virtual visit in the past 12 mos or appointment in next 3 mos     Recent Outpatient Visits              Yesterday Choking, initial encounter    Lani Ford MD    Telemedicine    3 weeks ago Soreness of tongue    Shruti Barrientos MD    Office Visit    1 month ago Chapin Lambert, [address], Enrique Cronin MD    Office Visit    2 months ago Need for vaccination    [address], [address] [place]    Nurse Only    3 months ago Encounter for annual health examination    Lani Ford MD    Office Visit                         Recent Outpatient Visits              Yesterday Choking, initial encounter    Lani Ford MD    Telemedicine    3 weeks ago Soreness of tongue    Lani Ford MD    Office Visit    1 month ago Charles Xiao, Enrique Cronin MD    Office Visit    2 months ago Need for vaccination    [address], [address] [facility] [place]    Nurse Only    3 months ago Encounter for annual health examination    Lani Ford MD    Office Visit

## 2024-02-01 ENCOUNTER — NURSE ONLY (OUTPATIENT)
Dept: FAMILY MEDICINE CLINIC | Facility: CLINIC | Age: 79
End: 2024-02-01

## 2024-02-01 DIAGNOSIS — E53.8 VITAMIN B12 DEFICIENCY: Primary | ICD-10-CM

## 2024-02-01 PROCEDURE — 96372 THER/PROPH/DIAG INJ SC/IM: CPT | Performed by: FAMILY MEDICINE

## 2024-02-01 RX ADMIN — CYANOCOBALAMIN 1000 MCG: 1000 INJECTION, SOLUTION INTRAMUSCULAR; SUBCUTANEOUS at 14:09:00

## 2024-03-04 ENCOUNTER — NURSE ONLY (OUTPATIENT)
Dept: FAMILY MEDICINE CLINIC | Facility: CLINIC | Age: 79
End: 2024-03-04

## 2024-03-04 RX ADMIN — CYANOCOBALAMIN 1000 MCG: 1000 INJECTION, SOLUTION INTRAMUSCULAR; SUBCUTANEOUS at 11:07:00

## 2024-05-10 ENCOUNTER — TELEPHONE (OUTPATIENT)
Dept: FAMILY MEDICINE CLINIC | Facility: CLINIC | Age: 79
End: 2024-05-10

## 2024-05-10 NOTE — TELEPHONE ENCOUNTER
Patient's son Yayo calling to request appointment for tomorrow around 12 for a nurse visit for a B12 injection in [place].    Are we able to accommodate this request?    Call Yayo to discuss and schedule

## 2024-05-15 ENCOUNTER — NURSE ONLY (OUTPATIENT)
Dept: FAMILY MEDICINE CLINIC | Facility: CLINIC | Age: 79
End: 2024-05-15

## 2024-05-15 DIAGNOSIS — E53.8 VITAMIN B12 DEFICIENCY: Primary | ICD-10-CM

## 2024-05-15 PROCEDURE — 96372 THER/PROPH/DIAG INJ SC/IM: CPT | Performed by: FAMILY MEDICINE

## 2024-05-15 RX ADMIN — CYANOCOBALAMIN 1000 MCG: 1000 INJECTION, SOLUTION INTRAMUSCULAR; SUBCUTANEOUS at 13:08:00

## 2024-06-20 ENCOUNTER — TELEPHONE (OUTPATIENT)
Dept: FAMILY MEDICINE CLINIC | Facility: CLINIC | Age: 79
End: 2024-06-20

## 2024-06-20 RX ORDER — MIRABEGRON 50 MG/1
50 TABLET, EXTENDED RELEASE ORAL DAILY
Qty: 30 TABLET | Refills: 0 | Status: SHIPPED | OUTPATIENT
Start: 2024-06-20 | End: 2024-07-20

## 2024-06-20 NOTE — TELEPHONE ENCOUNTER
Patient's son.Yousef (Last signed Verbal Release verified) contacted and made aware of Dr. Coe's recommendation and Rx sent. Patient will discuss Rx and any concerns with Dr. Coe at upcoming visit. Patient's son verbalized understanding. No further questions or concerns at this time.    Future Appointments   Date Time Provider Department Center   6/26/2024  1:00 PM Leon Coe MD [facility]

## 2024-06-20 NOTE — TELEPHONE ENCOUNTER
She should get this medication from urology  If working well they can  monitor and should be able to send in 6-12 month supply

## 2024-06-20 NOTE — TELEPHONE ENCOUNTER
Son Yayo(on HIPAA) indicated that patient was seeing urologist La Nena Sheldon and she prescribed myrbetriq 50mg daily for patient instead, since oxybutynin was not working.  Medication list updated. Son wanted Dr Coe to take over refilling medication. Prescription pended.     Appointment made for 6/26/2024 at 1pm with Dr Coe at [facility].

## 2024-06-26 ENCOUNTER — LAB ENCOUNTER (OUTPATIENT)
Dept: LAB | Age: 79
End: 2024-06-26
Attending: FAMILY MEDICINE

## 2024-06-26 ENCOUNTER — OFFICE VISIT (OUTPATIENT)
Dept: FAMILY MEDICINE CLINIC | Facility: CLINIC | Age: 79
End: 2024-06-26

## 2024-06-26 VITALS
SYSTOLIC BLOOD PRESSURE: 99 MMHG | WEIGHT: 100 LBS | HEART RATE: 74 BPM | DIASTOLIC BLOOD PRESSURE: 67 MMHG | HEIGHT: 61 IN | OXYGEN SATURATION: 96 % | BODY MASS INDEX: 18.88 KG/M2

## 2024-06-26 DIAGNOSIS — R73.03 PREDIABETES: ICD-10-CM

## 2024-06-26 DIAGNOSIS — F41.9 ANXIETY: ICD-10-CM

## 2024-06-26 DIAGNOSIS — E03.9 HYPOTHYROIDISM, UNSPECIFIED TYPE: ICD-10-CM

## 2024-06-26 DIAGNOSIS — F41.9 ANXIETY AND DEPRESSION: ICD-10-CM

## 2024-06-26 DIAGNOSIS — N28.9 DECREASED RENAL FUNCTION: ICD-10-CM

## 2024-06-26 DIAGNOSIS — R51.9 CHRONIC DAILY HEADACHE: ICD-10-CM

## 2024-06-26 DIAGNOSIS — E78.00 HYPERCHOLESTEREMIA: ICD-10-CM

## 2024-06-26 DIAGNOSIS — R20.0 RIGHT SIDED NUMBNESS: ICD-10-CM

## 2024-06-26 DIAGNOSIS — E53.8 VITAMIN B12 DEFICIENCY: ICD-10-CM

## 2024-06-26 DIAGNOSIS — R35.0 URINARY FREQUENCY: ICD-10-CM

## 2024-06-26 DIAGNOSIS — F32.A ANXIETY AND DEPRESSION: ICD-10-CM

## 2024-06-26 DIAGNOSIS — E55.9 VITAMIN D DEFICIENCY: ICD-10-CM

## 2024-06-26 DIAGNOSIS — R42 DIZZINESS: ICD-10-CM

## 2024-06-26 DIAGNOSIS — E53.8 VITAMIN B12 DEFICIENCY: Primary | ICD-10-CM

## 2024-06-26 LAB
ALBUMIN SERPL-MCNC: 4.3 G/DL (ref 3.2–4.8)
ALBUMIN/GLOB SERPL: 1.3 {RATIO} (ref 1–2)
ALP LIVER SERPL-CCNC: 125 U/L
ALT SERPL-CCNC: 9 U/L
ANION GAP SERPL CALC-SCNC: 4 MMOL/L (ref 0–18)
AST SERPL-CCNC: 23 U/L (ref ?–34)
BASOPHILS # BLD AUTO: 0.08 X10(3) UL (ref 0–0.2)
BASOPHILS NFR BLD AUTO: 1.3 %
BILIRUB SERPL-MCNC: 0.3 MG/DL (ref 0.2–1.1)
BILIRUB UR QL: NEGATIVE
BUN BLD-MCNC: 13 MG/DL (ref 9–23)
BUN/CREAT SERPL: 12.1 (ref 10–20)
CALCIUM BLD-MCNC: 9.6 MG/DL (ref 8.7–10.4)
CHLORIDE SERPL-SCNC: 110 MMOL/L (ref 98–112)
CHOLEST SERPL-MCNC: 196 MG/DL (ref ?–200)
CLARITY UR: CLEAR
CO2 SERPL-SCNC: 27 MMOL/L (ref 21–32)
CREAT BLD-MCNC: 1.07 MG/DL
DEPRECATED RDW RBC AUTO: 44 FL (ref 35.1–46.3)
EGFRCR SERPLBLD CKD-EPI 2021: 53 ML/MIN/1.73M2 (ref 60–?)
EOSINOPHIL # BLD AUTO: 0.5 X10(3) UL (ref 0–0.7)
EOSINOPHIL NFR BLD AUTO: 7.8 %
ERYTHROCYTE [DISTWIDTH] IN BLOOD BY AUTOMATED COUNT: 12.5 % (ref 11–15)
EST. AVERAGE GLUCOSE BLD GHB EST-MCNC: 126 MG/DL (ref 68–126)
FASTING PATIENT LIPID ANSWER: NO
FASTING STATUS PATIENT QL REPORTED: NO
GLOBULIN PLAS-MCNC: 3.2 G/DL (ref 2–3.5)
GLUCOSE BLD-MCNC: 95 MG/DL (ref 70–99)
GLUCOSE UR-MCNC: NORMAL MG/DL
HBA1C MFR BLD: 6 % (ref ?–5.7)
HCT VFR BLD AUTO: 36.8 %
HDLC SERPL-MCNC: 48 MG/DL (ref 40–59)
HGB BLD-MCNC: 12.2 G/DL
HGB UR QL STRIP.AUTO: NEGATIVE
IMM GRANULOCYTES # BLD AUTO: 0.02 X10(3) UL (ref 0–1)
IMM GRANULOCYTES NFR BLD: 0.3 %
KETONES UR-MCNC: NEGATIVE MG/DL
LDLC SERPL CALC-MCNC: 115 MG/DL (ref ?–100)
LEUKOCYTE ESTERASE UR QL STRIP.AUTO: NEGATIVE
LYMPHOCYTES # BLD AUTO: 1.59 X10(3) UL (ref 1–4)
LYMPHOCYTES NFR BLD AUTO: 24.8 %
MCH RBC QN AUTO: 32 PG (ref 26–34)
MCHC RBC AUTO-ENTMCNC: 33.2 G/DL (ref 31–37)
MCV RBC AUTO: 96.6 FL
MONOCYTES # BLD AUTO: 0.52 X10(3) UL (ref 0.1–1)
MONOCYTES NFR BLD AUTO: 8.1 %
NEUTROPHILS # BLD AUTO: 3.69 X10 (3) UL (ref 1.5–7.7)
NEUTROPHILS # BLD AUTO: 3.69 X10(3) UL (ref 1.5–7.7)
NEUTROPHILS NFR BLD AUTO: 57.7 %
NITRITE UR QL STRIP.AUTO: NEGATIVE
NONHDLC SERPL-MCNC: 148 MG/DL (ref ?–130)
OSMOLALITY SERPL CALC.SUM OF ELEC: 292 MOSM/KG (ref 275–295)
PH UR: 6 [PH] (ref 5–8)
PLATELET # BLD AUTO: 265 10(3)UL (ref 150–450)
POTASSIUM SERPL-SCNC: 4.9 MMOL/L (ref 3.5–5.1)
PROT SERPL-MCNC: 7.5 G/DL (ref 5.7–8.2)
PROT UR-MCNC: NEGATIVE MG/DL
RBC # BLD AUTO: 3.81 X10(6)UL
SODIUM SERPL-SCNC: 141 MMOL/L (ref 136–145)
SP GR UR STRIP: 1.01 (ref 1–1.03)
TRIGL SERPL-MCNC: 187 MG/DL (ref 30–149)
TSI SER-ACNC: 0.62 MIU/ML (ref 0.55–4.78)
UROBILINOGEN UR STRIP-ACNC: NORMAL
VIT B12 SERPL-MCNC: >2000 PG/ML (ref 211–911)
VIT D+METAB SERPL-MCNC: 32.1 NG/ML (ref 30–100)
VLDLC SERPL CALC-MCNC: 33 MG/DL (ref 0–30)
WBC # BLD AUTO: 6.4 X10(3) UL (ref 4–11)

## 2024-06-26 PROCEDURE — 82607 VITAMIN B-12: CPT

## 2024-06-26 PROCEDURE — 83036 HEMOGLOBIN GLYCOSYLATED A1C: CPT

## 2024-06-26 PROCEDURE — 99214 OFFICE O/P EST MOD 30 MIN: CPT | Performed by: FAMILY MEDICINE

## 2024-06-26 PROCEDURE — 85025 COMPLETE CBC W/AUTO DIFF WBC: CPT

## 2024-06-26 PROCEDURE — 84443 ASSAY THYROID STIM HORMONE: CPT

## 2024-06-26 PROCEDURE — 96372 THER/PROPH/DIAG INJ SC/IM: CPT | Performed by: FAMILY MEDICINE

## 2024-06-26 PROCEDURE — 36415 COLL VENOUS BLD VENIPUNCTURE: CPT

## 2024-06-26 PROCEDURE — 80053 COMPREHEN METABOLIC PANEL: CPT

## 2024-06-26 PROCEDURE — 81003 URINALYSIS AUTO W/O SCOPE: CPT | Performed by: FAMILY MEDICINE

## 2024-06-26 PROCEDURE — 80061 LIPID PANEL: CPT

## 2024-06-26 PROCEDURE — 82306 VITAMIN D 25 HYDROXY: CPT

## 2024-06-26 RX ORDER — MIRABEGRON 50 MG/1
50 TABLET, EXTENDED RELEASE ORAL DAILY
Qty: 90 TABLET | Refills: 1 | Status: SHIPPED | OUTPATIENT
Start: 2024-06-26 | End: 2024-07-26

## 2024-06-26 NOTE — PROGRESS NOTES
HPI:    Patient ID: Anum Deluca is a 79 year old female.      Medication Request  Pertinent negatives include no abdominal pain, chest pain, chills, coughing, fever, headaches, nausea, numbness, rash, vomiting or weakness.       Chief Complaint   Patient presents with    Medication Request     Pt need refills on medication (MYRBETRIQ)    Vaccinations     B-12 shot         Wt Readings from Last 6 Encounters:   06/26/24 100 lb (45.4 kg)   11/16/23 98 lb (44.5 kg)   10/17/23 97 lb 1.6 oz (44 kg)   08/22/23 99 lb (44.9 kg)   12/21/21 103 lb (46.7 kg)   02/01/20 116 lb 11.2 oz (52.9 kg)     BP Readings from Last 3 Encounters:   06/26/24 99/67   11/16/23 102/65   10/17/23 112/75     Taking thyroid, cholesterol  and myrbetriq  Mood stable    Numbness right arm/ leg. Saw neuro, scans were neg.  Feels subjective swelling but no swelling on exam.    Chronic headache stable    Has seen neuro but has been few years.    Has urinary frequency   Mood is good  No longer on mood stabilizer     Review of Systems   Constitutional:  Negative for chills and fever.   Eyes:  Negative for visual disturbance.   Respiratory:  Negative for cough, shortness of breath and wheezing.    Cardiovascular:  Negative for chest pain, palpitations and leg swelling.   Gastrointestinal:  Positive for constipation. Negative for abdominal pain, diarrhea, nausea and vomiting.   Genitourinary:  Positive for frequency and urgency. Negative for decreased urine volume and difficulty urinating.   Skin:  Negative for rash.   Neurological:  Negative for dizziness, tremors, seizures, weakness, light-headedness, numbness and headaches.   Psychiatric/Behavioral:  Negative for behavioral problems and decreased concentration.        BP 99/67 (BP Location: Left arm, Patient Position: Sitting, Cuff Size: adult)   Pulse 74   Ht 5' 1\" (1.549 m)   Wt 100 lb (45.4 kg)   SpO2 96%   BMI 18.89 kg/m²          Current Outpatient Medications   Medication Sig Dispense  Refill    Mirabegron ER (MYRBETRIQ) 50 MG Oral Tablet 24 Hr Take 1 tablet (50 mg total) by mouth daily. 90 tablet 1    ergocalciferol 1.25 MG (80775 UT) Oral Cap Take 1 capsule (50,000 Units total) by mouth every 7 days. 12 capsule 3    levothyroxine 50 MCG Oral Tab Take 1 tablet (50 mcg total) by mouth before breakfast. 90 tablet 3    Lovastatin 20 MG Oral Tab TAKE 1 TABLET BY MOUTH EVERY EVENING 90 tablet 3    acetaminophen 325 MG Oral Tab Take 2 tablets (650 mg total) by mouth every 8 (eight) hours as needed. (Patient not taking: Reported on 6/26/2024)       Allergies:  Allergies   Allergen Reactions    Penicillins HIVES and RASH    Sulfa Antibiotics RASH    Sulfanilamide HIVES      PHYSICAL EXAM:     Chief Complaint   Patient presents with    Medication Request     Pt need refills on medication (MYRBETRIQ)    Vaccinations     B-12 shot       Physical Exam  Vitals and nursing note reviewed.   Constitutional:       Appearance: She is well-developed.   Eyes:      Pupils: Pupils are equal, round, and reactive to light.   Neck:      Thyroid: No thyromegaly.   Cardiovascular:      Rate and Rhythm: Normal rate and regular rhythm.      Heart sounds: No murmur heard.  Pulmonary:      Effort: Pulmonary effort is normal.      Breath sounds: Normal breath sounds. No wheezing.   Abdominal:      Palpations: There is no mass.      Tenderness: There is no abdominal tenderness. There is no right CVA tenderness or left CVA tenderness.   Musculoskeletal:      Cervical back: Normal range of motion and neck supple.      Right lower leg: No edema.      Left lower leg: No edema.   Skin:     General: Skin is warm and dry.      Findings: No rash.   Neurological:      Mental Status: She is alert and oriented to person, place, and time.                ASSESSMENT/PLAN:     Encounter Diagnoses   Name Primary?    Vitamin B12 deficiency Yes    Hypothyroidism, unspecified type     Vitamin D deficiency     Anxiety and depression      Hypercholesteremia     Prediabetes     Chronic daily headache     Anxiety     Dizziness     Right sided numbness     Urinary frequency        1. Vitamin B12 deficiency  B12 injection today   - Vitamin B12; Future  - CBC With Differential With Platelet; Future    2. Hypothyroidism, unspecified type  Recheck levels  - TSH W Reflex To Free T4; Future    3. Vitamin D deficiency  Replaced   - Vitamin D [E]; Future    4. Anxiety and depression  Stable  Off meds    5. Hypercholesteremia  Check labs  - Lipid Panel; Future  - Comp Metabolic Panel (14); Future    6. Prediabetes  Minimize simple carbohydrates   - Hemoglobin A1C; Future    7. Chronic daily headache  Unchanged   Tylenol as needed  Follow up gyne  - Neuro Referral - In Network    8. Anxiety  stable    9. Dizziness  Check labs  Push fluids  Follow up neuro   EKG   - Neuro Referral - In Network    10. Right sided numbness  [place]  Past workup neg  Follow up neuro  - Neuro Referral - In Network    11. Urinary frequency  Stable on medication  No side effects with medication  Would like refill  - Urinalysis with Culture Reflex  - Mirabegron ER (MYRBETRIQ) 50 MG Oral Tablet 24 Hr; Take 1 tablet (50 mg total) by mouth daily.  Dispense: 90 tablet; Refill: 1      Orders Placed This Encounter   Procedures    Lipid Panel    Comp Metabolic Panel (14)    TSH W Reflex To Free T4    Vitamin B12    Hemoglobin A1C    Vitamin D [E]    CBC With Differential With Platelet    Urinalysis with Culture Reflex         The above note was creating using Dragon speech recognition technology. Please excuse any typos    Meds This Visit:  Requested Prescriptions     Signed Prescriptions Disp Refills    Mirabegron ER (MYRBETRIQ) 50 MG Oral Tablet 24 Hr 90 tablet 1     Sig: Take 1 tablet (50 mg total) by mouth daily.       Imaging & Referrals:  NEURO - INTERNAL  EKG 12-LEAD       ID#[id number]

## 2024-07-01 DIAGNOSIS — E03.9 HYPOTHYROIDISM, UNSPECIFIED TYPE: ICD-10-CM

## 2024-07-01 DIAGNOSIS — E78.00 HYPERCHOLESTEREMIA: ICD-10-CM

## 2024-07-01 DIAGNOSIS — E55.9 VITAMIN D DEFICIENCY: ICD-10-CM

## 2024-07-01 RX ORDER — ERGOCALCIFEROL 1.25 MG/1
50000 CAPSULE ORAL
Qty: 12 CAPSULE | Refills: 3 | Status: SHIPPED | OUTPATIENT
Start: 2024-07-01

## 2024-07-01 RX ORDER — LOVASTATIN 20 MG/1
TABLET ORAL
Qty: 90 TABLET | Refills: 3 | Status: SHIPPED | OUTPATIENT
Start: 2024-07-01

## 2024-07-01 RX ORDER — LEVOTHYROXINE SODIUM 0.05 MG/1
50 TABLET ORAL
Qty: 90 TABLET | Refills: 3 | Status: SHIPPED | OUTPATIENT
Start: 2024-07-01

## 2024-08-07 ENCOUNTER — TELEPHONE (OUTPATIENT)
Dept: FAMILY MEDICINE CLINIC | Facility: CLINIC | Age: 79
End: 2024-08-07

## 2024-08-07 DIAGNOSIS — E53.8 VITAMIN B12 DEFICIENCY: Primary | ICD-10-CM

## 2024-08-07 NOTE — TELEPHONE ENCOUNTER
Per son of patient, patient need to go to [place] office to have B12 shot.  Please advise, Thank you.

## 2024-08-07 NOTE — TELEPHONE ENCOUNTER
Please review & contact patient's son if patient is to continue with B12 injections.      Leon Coe MD  7/1/2024  9:28 PM CDT       Blood work shows B12 levels are good.       Vitamin B12  211 - 911 pg/mL >2,000 High

## 2024-08-08 NOTE — TELEPHONE ENCOUNTER
Yousef notified with understanding.  Advised daily Vitamin B12 was sent to take daily.  He requests nurse double check if patient is to take this every day or if weekly supplementation will suffice.  Please advise.

## 2024-08-09 NOTE — TELEPHONE ENCOUNTER
Patient's son contacted, ok per verbal release form (name and  of patient verified). Dr. Coe's message reviewed, verbalizes understanding.

## 2024-10-16 ENCOUNTER — NURSE TRIAGE (OUTPATIENT)
Dept: FAMILY MEDICINE CLINIC | Facility: CLINIC | Age: 79
End: 2024-10-16

## 2024-10-16 DIAGNOSIS — G47.09 OTHER INSOMNIA: Primary | ICD-10-CM

## 2024-10-16 RX ORDER — TRAZODONE HYDROCHLORIDE 50 MG/1
50 TABLET, FILM COATED ORAL NIGHTLY
Qty: 90 TABLET | Refills: 0 | Status: SHIPPED | OUTPATIENT
Start: 2024-10-16

## 2024-10-16 RX ORDER — ALPRAZOLAM 0.25 MG/1
0.25 TABLET ORAL NIGHTLY PRN
Qty: 30 TABLET | Refills: 0 | Status: CANCELLED
Start: 2024-10-16

## 2024-10-16 NOTE — TELEPHONE ENCOUNTER
Is she taking sertraline? Iof no would consider starting trazodone  Alprazolam/ ambien habit forming   May try OTC sleep aid such as tylenol pm

## 2024-10-16 NOTE — TELEPHONE ENCOUNTER
Dr. Coe:  Son would like to know if you will refill Alprazolam or if you would like visit to discuss?     Pended for review.       Action Requested: Summary for Provider     []  Critical Lab, Recommendations Needed  [] Need Additional Advice  []   FYI    []   Need Orders  [x] Need Medications Sent to Pharmacy  []  Other     SUMMARY:See above     Reason for call: Insomnia  Onset: chronic     Per Son Yayo (on verbal release)  Chronic sleep disturbance. Patient used to take Alprazolam but has not taken in a few years, Difficulty falling asleep. Goes to bed about 9-10pm and wakes up at 5-6 am.     Reason for Disposition   Insomnia is an ongoing problem (> 2 weeks)    Protocols used: Insomnia-A-[place]

## 2024-10-16 NOTE — TELEPHONE ENCOUNTER
Verified name and  of patient.    Son of patient (on release of information) was advised of Dr. Coe's message.     He states that she is not taking Sertraline. He states she has tried Tylenol PM with no relief. He is agreeable to have her try the Trazodone and is asking [facility] [place] on profile.

## 2024-11-19 ENCOUNTER — TELEMEDICINE (OUTPATIENT)
Dept: FAMILY MEDICINE CLINIC | Facility: CLINIC | Age: 79
End: 2024-11-19
Payer: MEDICARE

## 2024-11-19 DIAGNOSIS — Z78.0 POSTMENOPAUSAL: ICD-10-CM

## 2024-11-19 DIAGNOSIS — R42 DIZZINESS: ICD-10-CM

## 2024-11-19 DIAGNOSIS — E53.8 VITAMIN B12 DEFICIENCY: Primary | ICD-10-CM

## 2024-11-19 DIAGNOSIS — E78.00 HYPERCHOLESTEREMIA: ICD-10-CM

## 2024-11-19 DIAGNOSIS — R51.9 CHRONIC DAILY HEADACHE: ICD-10-CM

## 2024-11-19 DIAGNOSIS — R73.03 PREDIABETES: ICD-10-CM

## 2024-11-19 DIAGNOSIS — E03.9 HYPOTHYROIDISM, UNSPECIFIED TYPE: ICD-10-CM

## 2024-11-19 DIAGNOSIS — M54.50 CHRONIC LOW BACK PAIN, UNSPECIFIED BACK PAIN LATERALITY, UNSPECIFIED WHETHER SCIATICA PRESENT: ICD-10-CM

## 2024-11-19 DIAGNOSIS — R20.2 PARESTHESIA OF BILATERAL LEGS: ICD-10-CM

## 2024-11-19 DIAGNOSIS — G89.29 CHRONIC LOW BACK PAIN, UNSPECIFIED BACK PAIN LATERALITY, UNSPECIFIED WHETHER SCIATICA PRESENT: ICD-10-CM

## 2024-11-19 PROCEDURE — 99214 OFFICE O/P EST MOD 30 MIN: CPT | Performed by: FAMILY MEDICINE

## 2024-11-19 NOTE — PROGRESS NOTES
This visit is conducted using Telemedicine with live, interactive video and audio during this Coronavirus pandemic.    Please note that the following visit was completed using two-way, real-time interactive audio and/or video communication.  This has been done in good eric to provide continuity of care in the best interest of the provider-patient relationship, due to the ongoing public health crisis/national emergency and because of restrictions of visitation.  There are limitations of this visit as no physical exam could be performed.  Every conscious effort was taken to allow for sufficient and adequate time.  This billing was spent on reviewing labs, medications, radiology tests and decision making.  Appropriate medical decision-making and tests are ordered as detailed in the plan of care below    CONSUELO DELUCA or legal guardian   verbally consents to a Virtual/Telephone/ VideoCheck-In service on 11/19/2024  Patient understands and accepts financial responsibility for any deductible, co-insurance and/or co-pays associated with this service.    Duration of the service: 12 mins 31 secs      HPI:    Consuelo Deluca is a 79 year old female.      Chief Complaint   Patient presents with    Low Back Pain    Sleep Problem    Dizziness     States she has urinary frequency for years  When she gets up and feels weak, and feels dizzy and feels vision goes to dark    Feels sometimes legs get numb and fall sleep, has low back pain.  These symptoms happen at night  Able to go to sleep but gets up frequently    Feels no symptoms during the day- goes to the mall/ grocery stores.    Drinks less water.  Taking b12 oral supplements - 1000mcg daily          ROS  A comprehensive 10 point review of systems was completed.  Pertinent positives and negatives noted in the the HPI.      PATIENTS DENIES ANY KNOWN EXPOSURE TO ANYONE CONFIRMED FOR COVID 19.      Patient Active Problem List   Diagnosis    Depressive disorder     Hypothyroidism    Disorder of bone and cartilage    Vitamin D deficiency    Bilateral chronic knee pain    Anxiety    Diverticulosis of small intestine without hemorrhage    Anxiety and depression    Vitamin B12 deficiency    Hypercholesteremia    Urinary frequency    Hair loss    Other insomnia    Dyspnea on exertion    Pseudophakia of both eyes    PVD (posterior vitreous detachment), both eyes    Dry eye syndrome of lacrimal gland, bilateral    Overactive bladder    GULSHAN positive    Numbness and tingling in both hands    Leg heaviness    Dizziness    Chronic daily headache    Cervical stenosis of spinal canal    Prediabetes    Postural kyphosis       Past Medical History:    Arthritis    Dry eye syndrome of lacrimal gland, bilateral    Essential hypertension    Hypercholesterolemia    Pseudophakia of both eyes    PVD (posterior vitreous detachment), both eyes    Thyroid disease           MEDICATION LIST    Current Outpatient Medications:     traZODone 50 MG Oral Tab, Take 1 tablet (50 mg total) by mouth nightly., Disp: 90 tablet, Rfl: 0    ergocalciferol 1.25 MG (31987 UT) Oral Cap, Take 1 capsule (50,000 Units total) by mouth every 7 days., Disp: 12 capsule, Rfl: 3    levothyroxine 50 MCG Oral Tab, Take 1 tablet (50 mcg total) by mouth before breakfast., Disp: 90 tablet, Rfl: 3    Lovastatin 20 MG Oral Tab, TAKE 1 TABLET BY MOUTH EVERY EVENING, Disp: 90 tablet, Rfl: 3    acetaminophen 325 MG Oral Tab, Take 2 tablets (650 mg total) by mouth every 8 (eight) hours as needed. (Patient not taking: Reported on 6/26/2024), Disp: , Rfl:     ALLERGY LIST  Allergies[1]    Allergies:Allergies[2]    PHYSICAL EXAM:     Vitals signs taken at home if available:    There were no vitals taken for this visit.      Limited examination for this telephone/ video visit due to the coronavirus emergency      General Appearance:  alert, well developed, in no acute distress  Not in acute distress, comfortably seated in own  residence  Patient was speaking in complete sentences, no increased work of breathing and very coherent and alert on the phone.  Throat/Neck: normal sound to voice.   Respiratory:  non-labored. no increased work of breathing.    Skin:  normal moisture and skin texture, normal color of skin, no jaundice  Hematologic:  no excessive bruising  Psychiatric:  oriented to time, self, and place  Patient was responding to questions appropriately.        ASSESSMENT/PLAN:     Encounter Diagnoses   Name Primary?    Vitamin B12 deficiency Yes    Chronic daily headache     Prediabetes     Paresthesia of bilateral legs     Postmenopausal     Chronic low back pain, unspecified back pain laterality, unspecified whether sciatica present     Hypothyroidism, unspecified type     Hypercholesteremia     Dizziness        1. Vitamin B12 deficiency  Patient currently taking oral supplements.  Check levels  She was previously on injections monthly  - Vitamin B12; Future    2. Chronic daily headache  Unchanged.  Patient has seen a neurologist in the past    3. Prediabetes  Recommend avoiding sweets.  Patient admits to eating sweets  - Hemoglobin A1C; Future    4. Paresthesia of bilateral legs  Recommend checking levels as well as rule out diabetes  - Vitamin B12; Future  - Comp Metabolic Panel (14); Future  - Hemoglobin A1C; Future    5. Postmenopausal    - XR DEXA BONE DENSITOMETRY (CPT=77080); Future    6. Chronic low back pain, unspecified back pain laterality, unspecified whether sciatica present    - XR LUMBAR SPINE (MIN 2 VIEWS) (CPT=72100); Future    7. Hypothyroidism, unspecified type  Check levels  - TSH W Reflex To Free T4; Future    8. Hypercholesteremia  Continue cholesterol-lowering medication  - Lipid Panel; Future    9. Dizziness  Recommend completing EKG ordered prior as well as CBC  - CBC With Differential With Platelet; Future        Patient reminded to practice good health and safety measures including washing hands, social  distancing, covering mouth when coughing/ sneezing, avoid social meetings/ gatherings in face of this Covid 19 pandemic.    Patient verbalized understanding of plan and all questions answered to the best of my ability.    Patient to call back if any change/ worsening of symptoms.      Orders Placed This Encounter   Procedures    Vitamin B12    Comp Metabolic Panel (14)    Hemoglobin A1C    TSH W Reflex To Free T4    Lipid Panel    CBC With Differential With Platelet       Meds This Visit:  Requested Prescriptions      No prescriptions requested or ordered in this encounter       Imaging & Referrals:  XR DEXA BONE DENSITOMETRY (CPT=77080)  XR LUMBAR SPINE (MIN 2 VIEWS) (IBE=82507)        Leon Coe MD    11/19/2024  4:06 PM         [1]   Allergies  Allergen Reactions    Penicillins HIVES and RASH    Sulfa Antibiotics RASH    Sulfanilamide HIVES   [2]   Allergies  Allergen Reactions    Penicillins HIVES and RASH    Sulfa Antibiotics RASH    Sulfanilamide HIVES

## 2024-11-21 ENCOUNTER — PATIENT MESSAGE (OUTPATIENT)
Dept: FAMILY MEDICINE CLINIC | Facility: CLINIC | Age: 79
End: 2024-11-21

## 2024-12-03 NOTE — TELEPHONE ENCOUNTER
Office staff, patient asking if you can mail the placard renewal to her, please advise and let patient know.

## 2025-01-11 DIAGNOSIS — G47.09 OTHER INSOMNIA: ICD-10-CM

## 2025-01-15 RX ORDER — TRAZODONE HYDROCHLORIDE 50 MG/1
50 TABLET, FILM COATED ORAL NIGHTLY
Qty: 90 TABLET | Refills: 1 | Status: SHIPPED | OUTPATIENT
Start: 2025-01-15

## 2025-01-15 NOTE — TELEPHONE ENCOUNTER
Please review; protocol failed/No Protocol    Last Office Visit: 06/26/2024    Requested Prescriptions   Pending Prescriptions Disp Refills    TRAZODONE 50 MG Oral Tab [Pharmacy Med Name: TRAZODONE 50MG TABLETS] 90 tablet 0     Sig: TAKE 1 TABLET(50 MG) BY MOUTH EVERY NIGHT       There is no refill protocol information for this order          Recent Outpatient Visits              [facility]    6 months [facility]    [facility]    8 months ago Vitamin B12 deficiency    [facility] [address], [place]    Nurse Only    10 months ago     [facility] [address], [place]    Nurse Only    11 months ago Vitamin B12 deficiency    [facility], [address], [place]    Nurse Only

## 2025-01-28 DIAGNOSIS — R35.0 URINARY FREQUENCY: ICD-10-CM

## 2025-01-31 RX ORDER — MIRABEGRON 50 MG/1
50 TABLET, FILM COATED, EXTENDED RELEASE ORAL DAILY
Qty: 90 TABLET | Refills: 1 | Status: SHIPPED | OUTPATIENT
Start: 2025-01-31

## 2025-01-31 NOTE — TELEPHONE ENCOUNTER
Protocol Failed/ No Protocol    Requested Prescriptions   Pending Prescriptions Disp Refills    MYRBETRIQ 50 MG Oral Tablet 24 Hr [Pharmacy Med Name: MYRBETRIQ 50MG ER TABLETS] 90 tablet 1     Sig: TAKE 1 TABLET(50 MG) BY MOUTH DAILY       Genitourinary Medications Failed - 1/31/2025  3:36 PM        Failed - Medication is active on med list        Passed - Patient does not have pulmonary hypertension on problem list        Passed - In person appointment or virtual visit in the past 12 mos or appointment in next 3 mos     Recent Outpatient Visits              [facility]    7 months [facility]    [facility]    8 months ago Vitamin B12 deficiency    [facility] [address], [place]    Nurse Only    11 months ago     [facility] [address], [place]    Nurse Only    1 year ago Vitamin B12 deficiency    [facility], [place]    Nurse Only                             Recent Outpatient Visits              2 months ago [facility] B[facility]    7 months [facility]    [facility]    8 months ago Vitamin B12 deficiency    [facility] [address], [place]    Nurse Only    11 months ago     [facility] [address], [place]    Nurse Only    1 year ago Vitamin B12 deficiency    [facility], [address], [place]    Nurse Only

## 2025-05-07 ENCOUNTER — TELEPHONE (OUTPATIENT)
Dept: FAMILY MEDICINE CLINIC | Facility: CLINIC | Age: 80
End: 2025-05-07

## 2025-05-07 NOTE — TELEPHONE ENCOUNTER
Anum Deluca (Key: [id number])  Need Help? Call us at [phone]  Outcome  Additional Information Required  Prior Authorization Not Required  Drug  Myrbetriq 50MG er tablets  [facility] [place] cloud logo  Form  [employer] Electronic Prior Authorization Request Form (2017 NCPDP)

## 2025-06-18 DIAGNOSIS — E03.9 HYPOTHYROIDISM, UNSPECIFIED TYPE: ICD-10-CM

## 2025-06-20 RX ORDER — LEVOTHYROXINE SODIUM 50 UG/1
50 TABLET ORAL
Qty: 90 TABLET | Refills: 3 | OUTPATIENT
Start: 2025-06-20

## 2025-06-20 NOTE — TELEPHONE ENCOUNTER
Levothyroxine 50mc year supply sent to [facility] in [place] on 2024- dispense history shows 90 day supply dispensed on 2025

## 2025-07-30 DIAGNOSIS — G47.09 OTHER INSOMNIA: ICD-10-CM

## 2025-08-02 RX ORDER — TRAZODONE HYDROCHLORIDE 50 MG/1
50 TABLET ORAL NIGHTLY
Qty: 90 TABLET | Refills: 1 | Status: SHIPPED | OUTPATIENT
Start: 2025-08-02

## 2025-08-10 DIAGNOSIS — E03.9 HYPOTHYROIDISM, UNSPECIFIED TYPE: ICD-10-CM

## 2025-08-13 RX ORDER — LEVOTHYROXINE SODIUM 50 UG/1
50 TABLET ORAL
Qty: 10 TABLET | Refills: 0 | Status: SHIPPED | OUTPATIENT
Start: 2025-08-13

## (undated) DIAGNOSIS — E78.00 HYPERCHOLESTEREMIA: ICD-10-CM

## (undated) DIAGNOSIS — R35.0 URINARY FREQUENCY: Primary | ICD-10-CM

## (undated) NOTE — MR AVS SNAPSHOT
[facility] SPECIALTY [facility] [place] [address] [place]  [id number] [phone]               Thank you for choosing us for your health care visit with Claudell Bright. Ayub, MD.  We are glad to serve you and happy to provide you with this summary of yo TAKE 1 TABLET BY MOUTH EVERY NIGHT           MULTI-VITAMINS Tabs   Take 1 tablet by mouth once daily. Take with food           Sertraline HCl 50 MG Tabs   Take 1 tablet (50 mg total) by mouth once daily.    Commonly known as:  ZOLOFT           TYLENOL 325 M increments are effective and add up over the week   2 ½ hours per week – spread out over time Use a franco to keep you motivated   Don’t forget strength training with weights and resistance Set goals and track your progress   You don’t need to join a gym.

## (undated) NOTE — LETTER
06/08/19        Mohan Liang      Dear Samm Joyce,    [facility] records indicate that you have outstanding lab work and or testing that was ordered for you and has not yet been completed:  Orders Placed This Enc

## (undated) NOTE — LETTER
12/18/19        Mohan Liang      Dear Magdi Gonzalez,    Our records indicate that you have outstanding lab work and or testing that was ordered for you and has not yet been completed:  Orders Placed This Enc

## (undated) NOTE — LETTER
03/08/19        Mohan Liang      Dear Emerita Calle,    Our records indicate that you have outstanding lab work and or testing that was ordered for you and has not yet been completed:  Orders Placed This Enc

## (undated) NOTE — Clinical Note
HI Kelly  Patient saw you 1/2020, she was not able to get the imaging done last year due to the pandemic. I have ordered the imaging again that you had ordered and told them to follow up with you after tests.   Leon

## (undated) NOTE — LETTER
AUTHORIZATION FOR SURGICAL OPERATION OR OTHER PROCEDURE    1.  I hereby authorize Dr. She Flowers, and [facility] staff assigned to my case to perform the following operation and/or procedure at the [facility]:    _________________________ Time:  ________ A. M.  P.M.        Patient Name:  ______________________________________________________  (please print)      Patient signature:  ___________________________________________________             Relationship to Patient:

## (undated) NOTE — MR AVS SNAPSHOT
[facility] [address]  [address], [address]  [address] [place] [phone]               Thank you for choosing us for your health care visit with Nurse.   We are glad to serve you and happy to provide you with this summary of your vis Take 1 tablet (50 mg total) by mouth once daily. Commonly known as:  ZOLOFT           TYLENOL 325 MG Tabs   Generic drug:  acetaminophen   Take 2 tablets by mouth every 8 (eight) hours as needed.                    Medications Administered in the Office T

## (undated) NOTE — LETTER
8/31/2020              [address] [facility]         Dear Raegan Casey records indicate that the tests ordered for you by Leon Coe MD  have not been done.   If you have, in fact, already completed